# Patient Record
Sex: FEMALE | Race: WHITE | NOT HISPANIC OR LATINO | Employment: FULL TIME | URBAN - METROPOLITAN AREA
[De-identification: names, ages, dates, MRNs, and addresses within clinical notes are randomized per-mention and may not be internally consistent; named-entity substitution may affect disease eponyms.]

---

## 2017-12-08 ENCOUNTER — COMMUNICATION - HEALTHEAST (OUTPATIENT)
Dept: FAMILY MEDICINE | Facility: CLINIC | Age: 62
End: 2017-12-08

## 2018-01-09 ENCOUNTER — HOSPITAL ENCOUNTER (OUTPATIENT)
Dept: MAMMOGRAPHY | Facility: HOSPITAL | Age: 63
Discharge: HOME | End: 2018-01-09
Attending: FAMILY MEDICINE

## 2018-01-09 DIAGNOSIS — Z12.31 VISIT FOR SCREENING MAMMOGRAM: ICD-10-CM

## 2018-01-10 ENCOUNTER — RECORDS - HEALTHEAST (OUTPATIENT)
Dept: LAB | Facility: CLINIC | Age: 63
End: 2018-01-10

## 2018-01-10 LAB
CHOLEST SERPL-MCNC: 196 MG/DL
FASTING STATUS PATIENT QL REPORTED: NORMAL
HDLC SERPL-MCNC: 67 MG/DL
LDLC SERPL CALC-MCNC: 114 MG/DL
TRIGL SERPL-MCNC: 75 MG/DL

## 2018-01-11 LAB
HPV SOURCE: NORMAL
HUMAN PAPILLOMA VIRUS 16 DNA: NEGATIVE
HUMAN PAPILLOMA VIRUS 18 DNA: NEGATIVE
HUMAN PAPILLOMA VIRUS FINAL DIAGNOSIS: NORMAL
HUMAN PAPILLOMA VIRUS OTHER HR: NEGATIVE
SPECIMEN DESCRIPTION: NORMAL

## 2018-01-17 LAB
BKR LAB AP ABNORMAL BLEEDING: NO
BKR LAB AP BIRTH CONTROL/HORMONES: NORMAL
BKR LAB AP CERVICAL APPEARANCE: NORMAL
BKR LAB AP GYN ADEQUACY: NORMAL
BKR LAB AP GYN INTERPRETATION: NORMAL
BKR LAB AP HPV REFLEX: NORMAL
BKR LAB AP LMP: NORMAL
BKR LAB AP PATIENT STATUS: NORMAL
BKR LAB AP PREVIOUS ABNORMAL: NORMAL
BKR LAB AP PREVIOUS NORMAL: 2014
HIGH RISK?: NO
PATH REPORT.COMMENTS IMP SPEC: NORMAL
RESULT FLAG (HE HISTORICAL CONVERSION): NORMAL

## 2018-07-11 ENCOUNTER — COMMUNICATION - HEALTHEAST (OUTPATIENT)
Dept: FAMILY MEDICINE | Facility: CLINIC | Age: 63
End: 2018-07-11

## 2018-07-19 ENCOUNTER — OFFICE VISIT - HEALTHEAST (OUTPATIENT)
Dept: FAMILY MEDICINE | Facility: CLINIC | Age: 63
End: 2018-07-19

## 2018-07-19 ENCOUNTER — RECORDS - HEALTHEAST (OUTPATIENT)
Dept: ADMINISTRATIVE | Facility: OTHER | Age: 63
End: 2018-07-19

## 2018-07-19 ENCOUNTER — COMMUNICATION - HEALTHEAST (OUTPATIENT)
Dept: TELEHEALTH | Facility: CLINIC | Age: 63
End: 2018-07-19

## 2018-07-19 ENCOUNTER — AMBULATORY - HEALTHEAST (OUTPATIENT)
Dept: LAB | Facility: CLINIC | Age: 63
End: 2018-07-19

## 2018-07-19 DIAGNOSIS — Z13.1 SCREENING FOR DIABETES MELLITUS: ICD-10-CM

## 2018-07-19 DIAGNOSIS — R06.83 SNORING: ICD-10-CM

## 2018-07-19 DIAGNOSIS — Z13.29 SCREENING FOR THYROID DISORDER: ICD-10-CM

## 2018-07-19 DIAGNOSIS — Z13.228 SCREENING FOR ENDOCRINE, NUTRITIONAL, METABOLIC AND IMMUNITY DISORDER: ICD-10-CM

## 2018-07-19 DIAGNOSIS — Z13.0 SCREENING FOR ENDOCRINE, NUTRITIONAL, METABOLIC AND IMMUNITY DISORDER: ICD-10-CM

## 2018-07-19 DIAGNOSIS — Z13.220 SCREENING, LIPID: ICD-10-CM

## 2018-07-19 DIAGNOSIS — E66.01 SEVERE OBESITY (BMI 35.0-39.9) WITH COMORBIDITY (H): ICD-10-CM

## 2018-07-19 DIAGNOSIS — Z78.0 POSTMENOPAUSAL STATUS: ICD-10-CM

## 2018-07-19 DIAGNOSIS — G89.29 BILATERAL CHRONIC KNEE PAIN: ICD-10-CM

## 2018-07-19 DIAGNOSIS — M54.42 CHRONIC LEFT-SIDED LOW BACK PAIN WITH LEFT-SIDED SCIATICA: ICD-10-CM

## 2018-07-19 DIAGNOSIS — G89.29 CHRONIC LEFT-SIDED LOW BACK PAIN WITH LEFT-SIDED SCIATICA: ICD-10-CM

## 2018-07-19 DIAGNOSIS — Z13.29 SCREENING FOR ENDOCRINE, NUTRITIONAL, METABOLIC AND IMMUNITY DISORDER: ICD-10-CM

## 2018-07-19 DIAGNOSIS — F41.8 DEPRESSION WITH ANXIETY: ICD-10-CM

## 2018-07-19 DIAGNOSIS — M25.561 BILATERAL CHRONIC KNEE PAIN: ICD-10-CM

## 2018-07-19 DIAGNOSIS — Z13.21 SCREENING FOR ENDOCRINE, NUTRITIONAL, METABOLIC AND IMMUNITY DISORDER: ICD-10-CM

## 2018-07-19 DIAGNOSIS — I10 ESSENTIAL HYPERTENSION: ICD-10-CM

## 2018-07-19 DIAGNOSIS — M25.562 BILATERAL CHRONIC KNEE PAIN: ICD-10-CM

## 2018-07-19 DIAGNOSIS — Z13.0 SCREENING, ANEMIA, DEFICIENCY, IRON: ICD-10-CM

## 2018-07-19 DIAGNOSIS — Z13.228 SCREENING FOR METABOLIC DISORDER: ICD-10-CM

## 2018-07-19 LAB
ALBUMIN SERPL-MCNC: 3.7 G/DL (ref 3.5–5)
ALP SERPL-CCNC: 61 U/L (ref 45–120)
ALT SERPL W P-5'-P-CCNC: 13 U/L (ref 0–45)
ANION GAP SERPL CALCULATED.3IONS-SCNC: 11 MMOL/L (ref 5–18)
AST SERPL W P-5'-P-CCNC: 19 U/L (ref 0–40)
BASOPHILS # BLD AUTO: 0 THOU/UL (ref 0–0.2)
BASOPHILS NFR BLD AUTO: 1 % (ref 0–2)
BILIRUB SERPL-MCNC: 0.9 MG/DL (ref 0–1)
BUN SERPL-MCNC: 17 MG/DL (ref 8–22)
CALCIUM SERPL-MCNC: 9.5 MG/DL (ref 8.5–10.5)
CHLORIDE BLD-SCNC: 109 MMOL/L (ref 98–107)
CHOLEST SERPL-MCNC: 200 MG/DL
CO2 SERPL-SCNC: 24 MMOL/L (ref 22–31)
CREAT SERPL-MCNC: 0.77 MG/DL (ref 0.6–1.1)
EOSINOPHIL # BLD AUTO: 0.2 THOU/UL (ref 0–0.4)
EOSINOPHIL NFR BLD AUTO: 4 % (ref 0–6)
ERYTHROCYTE [DISTWIDTH] IN BLOOD BY AUTOMATED COUNT: 11.6 % (ref 11–14.5)
FASTING STATUS PATIENT QL REPORTED: YES
GFR SERPL CREATININE-BSD FRML MDRD: >60 ML/MIN/1.73M2
GLUCOSE BLD-MCNC: 95 MG/DL (ref 70–125)
HBA1C MFR BLD: 5.4 % (ref 3.5–6)
HCT VFR BLD AUTO: 42.4 % (ref 35–47)
HDLC SERPL-MCNC: 64 MG/DL
HGB BLD-MCNC: 14.6 G/DL (ref 12–16)
LDLC SERPL CALC-MCNC: 125 MG/DL
LYMPHOCYTES # BLD AUTO: 1.7 THOU/UL (ref 0.8–4.4)
LYMPHOCYTES NFR BLD AUTO: 33 % (ref 20–40)
MCH RBC QN AUTO: 30.5 PG (ref 27–34)
MCHC RBC AUTO-ENTMCNC: 34.4 G/DL (ref 32–36)
MCV RBC AUTO: 89 FL (ref 80–100)
MONOCYTES # BLD AUTO: 0.4 THOU/UL (ref 0–0.9)
MONOCYTES NFR BLD AUTO: 7 % (ref 2–10)
NEUTROPHILS # BLD AUTO: 3 THOU/UL (ref 2–7.7)
NEUTROPHILS NFR BLD AUTO: 55 % (ref 50–70)
PLATELET # BLD AUTO: 212 THOU/UL (ref 140–440)
PMV BLD AUTO: 7.8 FL (ref 7–10)
POTASSIUM BLD-SCNC: 4.3 MMOL/L (ref 3.5–5)
PROT SERPL-MCNC: 6.8 G/DL (ref 6–8)
RBC # BLD AUTO: 4.77 MILL/UL (ref 3.8–5.4)
SODIUM SERPL-SCNC: 144 MMOL/L (ref 136–145)
TRIGL SERPL-MCNC: 57 MG/DL
TSH SERPL DL<=0.005 MIU/L-ACNC: 1.51 UIU/ML (ref 0.3–5)
WBC: 5.3 THOU/UL (ref 4–11)

## 2018-07-19 RX ORDER — CITALOPRAM HYDROBROMIDE 20 MG/1
TABLET ORAL
Status: SHIPPED | COMMUNITY
Start: 2018-07-18

## 2018-07-19 ASSESSMENT — MIFFLIN-ST. JEOR: SCORE: 1887.18

## 2018-07-19 NOTE — ASSESSMENT & PLAN NOTE
Recommend follow up with primary MD to address this. Weight loss may also help.   HTN  Patient is asked to monitor BP at home or work, several times per month and return with written values at next office visit. Goal bp is 120/80. If staying higher than 130/85 on three occasions you should bring the values into clinic so that we can evaluate and treat if needed.    Recommend stop etoh, caffiene, ibuprofen, carbonated drinks, sudafed & decrease salt intake.

## 2018-07-19 NOTE — ASSESSMENT & PLAN NOTE
Pt reported at weight loss intake.   STOPBANG (SLEEP APNEA RISK ) SCORE:   Do you SNORE loudly (louder than talking or loud enough to be heard through closed door?  UNKNOWN - LIVES ALONE.  She does wake herself up with snoring sometimes    Do you often feel TIRED fatigued, or sleepy during daytime?  no    Has anyone OBSERVED you stop breathing during sleep?  unknown    Do you have or are you being treated for high blood PRESSURE? She has bp 140/84    BMI over 35? Yes   AGE over 50? yes  NECK circumfrence over 16?  15.5  GENDER male? no      High risk of WILLAM: Yes 5 - 8   Intermediate risk of WILLAM: Yes 3 - 4   Low risk of WILLAM: Yes 0 - 2      Suspect intermediate to high risk of sleep apnea. Recommend sleep study. She declined this.

## 2018-07-20 LAB — 25(OH)D3 SERPL-MCNC: 23.7 NG/ML (ref 30–80)

## 2018-07-30 ENCOUNTER — COMMUNICATION - HEALTHEAST (OUTPATIENT)
Dept: FAMILY MEDICINE | Facility: CLINIC | Age: 63
End: 2018-07-30

## 2019-02-14 ENCOUNTER — HOSPITAL ENCOUNTER (OUTPATIENT)
Dept: PHYSICAL MEDICINE AND REHAB | Facility: CLINIC | Age: 64
Discharge: HOME | End: 2019-02-14
Attending: PAIN MEDICINE

## 2019-02-14 DIAGNOSIS — M47.816 LUMBAR FACET ARTHROPATHY: ICD-10-CM

## 2019-02-14 DIAGNOSIS — M79.18 MYOFASCIAL PAIN: ICD-10-CM

## 2019-02-14 DIAGNOSIS — M54.16 LUMBAR RADICULOPATHY: ICD-10-CM

## 2019-02-14 DIAGNOSIS — M41.9 SCOLIOSIS OF THORACOLUMBAR SPINE, UNSPECIFIED SCOLIOSIS TYPE: ICD-10-CM

## 2019-02-14 ASSESSMENT — MIFFLIN-ST. JEOR: SCORE: 1887.06

## 2019-02-19 ENCOUNTER — OFFICE VISIT - HEALTHEAST (OUTPATIENT)
Dept: PHYSICAL THERAPY | Facility: REHABILITATION | Age: 64
End: 2019-02-19

## 2019-02-19 DIAGNOSIS — M54.16 RIGHT LUMBAR RADICULOPATHY: ICD-10-CM

## 2019-02-19 DIAGNOSIS — M62.81 GENERALIZED MUSCLE WEAKNESS: ICD-10-CM

## 2019-02-26 ENCOUNTER — RECORDS - HEALTHEAST (OUTPATIENT)
Dept: RADIOLOGY | Facility: CLINIC | Age: 64
End: 2019-02-26

## 2019-02-26 ENCOUNTER — OFFICE VISIT - HEALTHEAST (OUTPATIENT)
Dept: PHYSICAL THERAPY | Facility: REHABILITATION | Age: 64
End: 2019-02-26

## 2019-02-26 DIAGNOSIS — M62.81 GENERALIZED MUSCLE WEAKNESS: ICD-10-CM

## 2019-02-26 DIAGNOSIS — M54.16 RIGHT LUMBAR RADICULOPATHY: ICD-10-CM

## 2019-03-01 ENCOUNTER — OFFICE VISIT - HEALTHEAST (OUTPATIENT)
Dept: PHYSICAL THERAPY | Facility: REHABILITATION | Age: 64
End: 2019-03-01

## 2019-03-01 DIAGNOSIS — M54.16 RIGHT LUMBAR RADICULOPATHY: ICD-10-CM

## 2019-03-01 DIAGNOSIS — M62.81 GENERALIZED MUSCLE WEAKNESS: ICD-10-CM

## 2019-03-05 ENCOUNTER — OFFICE VISIT - HEALTHEAST (OUTPATIENT)
Dept: PHYSICAL THERAPY | Facility: REHABILITATION | Age: 64
End: 2019-03-05

## 2019-03-05 DIAGNOSIS — M54.16 RIGHT LUMBAR RADICULOPATHY: ICD-10-CM

## 2019-03-05 DIAGNOSIS — M62.81 GENERALIZED MUSCLE WEAKNESS: ICD-10-CM

## 2019-03-08 ENCOUNTER — OFFICE VISIT - HEALTHEAST (OUTPATIENT)
Dept: PHYSICAL THERAPY | Facility: REHABILITATION | Age: 64
End: 2019-03-08

## 2019-03-08 ENCOUNTER — COMMUNICATION - HEALTHEAST (OUTPATIENT)
Dept: PHYSICAL MEDICINE AND REHAB | Facility: CLINIC | Age: 64
End: 2019-03-08

## 2019-03-08 DIAGNOSIS — M54.16 RIGHT LUMBAR RADICULOPATHY: ICD-10-CM

## 2019-03-08 DIAGNOSIS — M62.81 GENERALIZED MUSCLE WEAKNESS: ICD-10-CM

## 2019-03-11 ENCOUNTER — OFFICE VISIT - HEALTHEAST (OUTPATIENT)
Dept: PHYSICAL THERAPY | Facility: REHABILITATION | Age: 64
End: 2019-03-11

## 2019-03-11 DIAGNOSIS — I10 ESSENTIAL HYPERTENSION: ICD-10-CM

## 2019-03-11 DIAGNOSIS — M54.16 RIGHT LUMBAR RADICULOPATHY: ICD-10-CM

## 2019-03-11 DIAGNOSIS — M62.81 GENERALIZED MUSCLE WEAKNESS: ICD-10-CM

## 2019-03-11 DIAGNOSIS — E66.01 SEVERE OBESITY (BMI 35.0-39.9) WITH COMORBIDITY (H): ICD-10-CM

## 2019-03-11 DIAGNOSIS — F41.8 DEPRESSION WITH ANXIETY: ICD-10-CM

## 2019-03-13 ENCOUNTER — OFFICE VISIT - HEALTHEAST (OUTPATIENT)
Dept: PHYSICAL THERAPY | Facility: REHABILITATION | Age: 64
End: 2019-03-13

## 2019-03-13 DIAGNOSIS — F41.8 DEPRESSION WITH ANXIETY: ICD-10-CM

## 2019-03-13 DIAGNOSIS — I10 ESSENTIAL HYPERTENSION: ICD-10-CM

## 2019-03-13 DIAGNOSIS — M54.16 RIGHT LUMBAR RADICULOPATHY: ICD-10-CM

## 2019-03-13 DIAGNOSIS — M62.81 GENERALIZED MUSCLE WEAKNESS: ICD-10-CM

## 2019-03-13 DIAGNOSIS — E66.01 SEVERE OBESITY (BMI 35.0-39.9) WITH COMORBIDITY (H): ICD-10-CM

## 2019-03-18 ENCOUNTER — HOSPITAL ENCOUNTER (OUTPATIENT)
Dept: PHYSICAL MEDICINE AND REHAB | Facility: CLINIC | Age: 64
Discharge: HOME | End: 2019-03-18
Attending: PAIN MEDICINE

## 2019-03-18 DIAGNOSIS — M17.0 PRIMARY OSTEOARTHRITIS OF BOTH KNEES: ICD-10-CM

## 2019-03-18 DIAGNOSIS — M41.9 SCOLIOSIS OF THORACOLUMBAR SPINE, UNSPECIFIED SCOLIOSIS TYPE: ICD-10-CM

## 2019-03-18 DIAGNOSIS — M47.816 LUMBAR FACET ARTHROPATHY: ICD-10-CM

## 2019-03-18 DIAGNOSIS — M79.18 MYOFASCIAL PAIN: ICD-10-CM

## 2019-03-18 DIAGNOSIS — M54.16 LUMBAR RADICULOPATHY: ICD-10-CM

## 2019-03-18 RX ORDER — IBUPROFEN 200 MG
600 TABLET ORAL DAILY
Status: SHIPPED | COMMUNITY
Start: 2019-03-18

## 2019-03-21 ENCOUNTER — HOSPITAL ENCOUNTER (OUTPATIENT)
Dept: PHYSICAL MEDICINE AND REHAB | Facility: CLINIC | Age: 64
Discharge: HOME | End: 2019-03-21
Attending: PAIN MEDICINE

## 2019-03-21 DIAGNOSIS — M17.0 PRIMARY OSTEOARTHRITIS OF BOTH KNEES: ICD-10-CM

## 2019-03-27 ENCOUNTER — HOSPITAL ENCOUNTER (OUTPATIENT)
Dept: MAMMOGRAPHY | Facility: CLINIC | Age: 64
Discharge: HOME | End: 2019-03-27
Attending: FAMILY MEDICINE

## 2019-03-27 DIAGNOSIS — Z12.31 ENCOUNTER FOR SCREENING MAMMOGRAM FOR BREAST CANCER: ICD-10-CM

## 2019-04-22 ENCOUNTER — HOSPITAL ENCOUNTER (OUTPATIENT)
Dept: PHYSICAL MEDICINE AND REHAB | Facility: CLINIC | Age: 64
Discharge: HOME | End: 2019-04-22
Attending: PAIN MEDICINE

## 2019-04-22 DIAGNOSIS — M41.9 SCOLIOSIS OF THORACOLUMBAR SPINE, UNSPECIFIED SCOLIOSIS TYPE: ICD-10-CM

## 2019-04-22 DIAGNOSIS — M79.18 MYOFASCIAL PAIN: ICD-10-CM

## 2019-04-22 DIAGNOSIS — M54.16 LUMBAR RADICULOPATHY: ICD-10-CM

## 2019-04-22 DIAGNOSIS — M17.0 PRIMARY OSTEOARTHRITIS OF BOTH KNEES: ICD-10-CM

## 2019-04-22 ASSESSMENT — MIFFLIN-ST. JEOR: SCORE: 1907.02

## 2019-05-09 ENCOUNTER — OFFICE VISIT - HEALTHEAST (OUTPATIENT)
Dept: FAMILY MEDICINE | Facility: CLINIC | Age: 64
End: 2019-05-09

## 2019-05-09 DIAGNOSIS — H81.12 BENIGN PAROXYSMAL POSITIONAL VERTIGO OF LEFT EAR: ICD-10-CM

## 2019-05-09 DIAGNOSIS — M54.42 CHRONIC LEFT-SIDED LOW BACK PAIN WITH LEFT-SIDED SCIATICA: ICD-10-CM

## 2019-05-09 DIAGNOSIS — G89.29 CHRONIC LEFT-SIDED LOW BACK PAIN WITH LEFT-SIDED SCIATICA: ICD-10-CM

## 2019-05-09 DIAGNOSIS — E66.01 SEVERE OBESITY (BMI 35.0-39.9) WITH COMORBIDITY (H): ICD-10-CM

## 2019-05-09 RX ORDER — CLOBETASOL PROPIONATE 0.5 MG/ML
SOLUTION TOPICAL
Refills: 6 | Status: SHIPPED | COMMUNITY
Start: 2019-04-23

## 2019-05-09 RX ORDER — FLUOCINOLONE ACETONIDE 0.11 MG/ML
OIL TOPICAL
Status: SHIPPED | COMMUNITY
Start: 2019-04-23

## 2019-05-09 RX ORDER — KETOCONAZOLE 20 MG/ML
SHAMPOO TOPICAL
Refills: 6 | Status: SHIPPED | COMMUNITY
Start: 2019-04-23

## 2019-05-09 ASSESSMENT — MIFFLIN-ST. JEOR: SCORE: 1947.84

## 2019-05-09 NOTE — ASSESSMENT & PLAN NOTE
Newly diagnosed in clinic today after having patient lay down and turn her head to the left she had vertical nystagmus which lasted under 60 seconds.    She is referred to physical therapy and if that is not successful we could consider ENT consult.

## 2019-05-09 NOTE — ASSESSMENT & PLAN NOTE
She says that she is seeing Dr. Bland at the [facility] spine center and is doing physical therapy and taking gabapentin 3 timesdaily which helps her pain.  She is done some intervertebral spine injections through the orthopedic surgeon in the past but did not like the idea of doing that so frequently and so prefers the idea of physical therapy and gabapentin.

## 2019-05-15 ENCOUNTER — COMMUNICATION - HEALTHEAST (OUTPATIENT)
Dept: PHYSICAL MEDICINE AND REHAB | Facility: CLINIC | Age: 64
End: 2019-05-15

## 2019-05-15 DIAGNOSIS — M54.16 LUMBAR RADICULOPATHY: ICD-10-CM

## 2019-05-15 DIAGNOSIS — M79.18 MYOFASCIAL PAIN: ICD-10-CM

## 2019-05-15 DIAGNOSIS — M41.9 SCOLIOSIS OF THORACOLUMBAR SPINE, UNSPECIFIED SCOLIOSIS TYPE: ICD-10-CM

## 2019-05-20 ENCOUNTER — COMMUNICATION - HEALTHEAST (OUTPATIENT)
Dept: PHYSICAL MEDICINE AND REHAB | Facility: CLINIC | Age: 64
End: 2019-05-20

## 2019-05-29 ENCOUNTER — COMMUNICATION - HEALTHEAST (OUTPATIENT)
Dept: SURGERY | Facility: CLINIC | Age: 64
End: 2019-05-29

## 2019-06-07 ENCOUNTER — COMMUNICATION - HEALTHEAST (OUTPATIENT)
Dept: FAMILY MEDICINE | Facility: CLINIC | Age: 64
End: 2019-06-07

## 2019-09-05 ENCOUNTER — COMMUNICATION - HEALTHEAST (OUTPATIENT)
Dept: PHYSICAL MEDICINE AND REHAB | Facility: CLINIC | Age: 64
End: 2019-09-05

## 2019-09-05 DIAGNOSIS — M79.18 MYOFASCIAL PAIN: ICD-10-CM

## 2019-09-05 DIAGNOSIS — M54.16 LUMBAR RADICULOPATHY: ICD-10-CM

## 2019-09-05 DIAGNOSIS — M41.9 SCOLIOSIS OF THORACOLUMBAR SPINE, UNSPECIFIED SCOLIOSIS TYPE: ICD-10-CM

## 2019-09-06 RX ORDER — GABAPENTIN 300 MG/1
CAPSULE ORAL
Qty: 270 CAPSULE | Refills: 1 | Status: SHIPPED | OUTPATIENT
Start: 2019-09-06 | End: 2021-10-01

## 2020-03-02 ENCOUNTER — RECORDS - HEALTHEAST (OUTPATIENT)
Dept: LAB | Facility: CLINIC | Age: 65
End: 2020-03-02

## 2020-03-03 LAB — 25(OH)D3 SERPL-MCNC: 24.1 NG/ML (ref 30–80)

## 2020-05-20 ENCOUNTER — HOSPITAL ENCOUNTER (OUTPATIENT)
Dept: MAMMOGRAPHY | Facility: CLINIC | Age: 65
Discharge: HOME | End: 2020-05-20
Attending: FAMILY MEDICINE

## 2020-05-20 DIAGNOSIS — Z12.31 VISIT FOR SCREENING MAMMOGRAM: ICD-10-CM

## 2020-09-09 ENCOUNTER — RECORDS - HEALTHEAST (OUTPATIENT)
Dept: LAB | Facility: CLINIC | Age: 65
End: 2020-09-09

## 2020-09-09 LAB
ANION GAP SERPL CALCULATED.3IONS-SCNC: 9 MMOL/L (ref 5–18)
BUN SERPL-MCNC: 18 MG/DL (ref 8–22)
CALCIUM SERPL-MCNC: 9.3 MG/DL (ref 8.5–10.5)
CHLORIDE BLD-SCNC: 107 MMOL/L (ref 98–107)
CO2 SERPL-SCNC: 26 MMOL/L (ref 22–31)
CREAT SERPL-MCNC: 0.86 MG/DL (ref 0.6–1.1)
GFR SERPL CREATININE-BSD FRML MDRD: >60 ML/MIN/1.73M2
GLUCOSE BLD-MCNC: 89 MG/DL (ref 70–125)
POTASSIUM BLD-SCNC: 4.4 MMOL/L (ref 3.5–5)
SODIUM SERPL-SCNC: 142 MMOL/L (ref 136–145)
TSH SERPL DL<=0.005 MIU/L-ACNC: 0.97 UIU/ML (ref 0.3–5)

## 2020-10-01 ENCOUNTER — RECORDS - HEALTHEAST (OUTPATIENT)
Dept: LAB | Facility: CLINIC | Age: 65
End: 2020-10-01

## 2020-10-01 LAB
ANION GAP SERPL CALCULATED.3IONS-SCNC: 9 MMOL/L (ref 5–18)
BUN SERPL-MCNC: 12 MG/DL (ref 8–22)
CALCIUM SERPL-MCNC: 9.1 MG/DL (ref 8.5–10.5)
CHLORIDE BLD-SCNC: 105 MMOL/L (ref 98–107)
CO2 SERPL-SCNC: 26 MMOL/L (ref 22–31)
CREAT SERPL-MCNC: 0.79 MG/DL (ref 0.6–1.1)
GFR SERPL CREATININE-BSD FRML MDRD: >60 ML/MIN/1.73M2
GLUCOSE BLD-MCNC: 80 MG/DL (ref 70–125)
POTASSIUM BLD-SCNC: 4.3 MMOL/L (ref 3.5–5)
SODIUM SERPL-SCNC: 140 MMOL/L (ref 136–145)

## 2020-12-03 ENCOUNTER — RECORDS - HEALTHEAST (OUTPATIENT)
Dept: LAB | Facility: HOSPITAL | Age: 65
End: 2020-12-03

## 2020-12-03 LAB
ERYTHROCYTE [DISTWIDTH] IN BLOOD BY AUTOMATED COUNT: 13.5 % (ref 11–14.5)
HCT VFR BLD AUTO: 44.9 % (ref 35–47)
HGB BLD-MCNC: 14.3 G/DL (ref 12–16)
MCH RBC QN AUTO: 28.8 PG (ref 27–34)
MCHC RBC AUTO-ENTMCNC: 31.8 G/DL (ref 32–36)
MCV RBC AUTO: 90 FL (ref 80–100)
PLATELET # BLD AUTO: 226 THOU/UL (ref 140–440)
PMV BLD AUTO: 10 FL (ref 8.5–12.5)
RBC # BLD AUTO: 4.97 MILL/UL (ref 3.8–5.4)
WBC: 4.8 THOU/UL (ref 4–11)

## 2021-05-24 ENCOUNTER — RECORDS - HEALTHEAST (OUTPATIENT)
Dept: ADMINISTRATIVE | Facility: CLINIC | Age: 66
End: 2021-05-24

## 2021-05-25 ENCOUNTER — RECORDS - HEALTHEAST (OUTPATIENT)
Dept: ADMINISTRATIVE | Facility: CLINIC | Age: 66
End: 2021-05-25

## 2021-05-25 ENCOUNTER — RECORDS - HEALTHEAST (OUTPATIENT)
Dept: SCHEDULING | Facility: CLINIC | Age: 66
End: 2021-05-25

## 2021-05-25 DIAGNOSIS — Z12.31 SCREENING MAMMOGRAM, ENCOUNTER FOR: ICD-10-CM

## 2021-05-26 ENCOUNTER — RECORDS - HEALTHEAST (OUTPATIENT)
Dept: ADMINISTRATIVE | Facility: CLINIC | Age: 66
End: 2021-05-26

## 2021-05-27 ENCOUNTER — RECORDS - HEALTHEAST (OUTPATIENT)
Dept: ADMINISTRATIVE | Facility: CLINIC | Age: 66
End: 2021-05-27

## 2021-05-28 ENCOUNTER — RECORDS - HEALTHEAST (OUTPATIENT)
Dept: ADMINISTRATIVE | Facility: CLINIC | Age: 66
End: 2021-05-28

## 2021-05-28 NOTE — PATIENT INSTRUCTIONS - HE
Please slowly increase her gabapentin to 900 mg 3 times a day.    Please continue doing her physical therapy exercises on a daily basis.    ~Please call Nurse Navigation line [phone] with any questions or concerns about your treatment plan, if symptoms worsen and you would like to be seen urgently, or if you have problems controlling bladder and bowel function.

## 2021-05-28 NOTE — PROGRESS NOTES
Assessment:     Diagnoses and all orders for this visit:    Lumbar radiculopathy    Primary osteoarthritis of both knees    Myofascial pain    Scoliosis of thoracolumbar spine, unspecified scoliosis type       Miryam Hoang is a 63 y.o. y.o. female with past medical history significant for obesity, depression, anxiety, snoring, postmenopausal status, bilateral chronic knee pain, essential hypertension, chronic left-sided low back with left-sided sciatica who presents today for follow-up regarding right knee joint injection under ultrasound guidance on 3/21/2018:    -Patient reports she received 100% relief from her right knee injection.  She continues to have some radicular type pain in her right lower extremity, however is much improved from our first visit.  She continues on gabapentin.     Plan:     A shared decision making plan was used. The patient's values and choices were respected. Prior medical records from her last visit on 3/18/2018 were reviewed today. The following represents what was discussed and decided upon by the provider and the patient.        -DIAGNOSTIC TESTS:  --MRI of the lumbar spine dated 11/7/2018 report is reviewed.  It does show a disc protrusion at L3-4 with possible contact to the right L4 nerve root.  At L1-L2 there is a slight increase in right central disc protrusion.  L2-3 there is an unchanged disc protrusion that may compress the left L3 nerve root.  At L4-5 there is unchanged moderate disc bulge with central disc protrusion with contact of bilateral traversing L5 nerve roots.  There is mild to moderate facet arthropathy at L5-S1, L4-5.    -INTERVENTIONS: No interventions at this time.    -MEDICATIONS: Recommend that she increase her gabapentin to 900 mg 3 times a day.  She will do this in a stepwise fashion as before.  -  Discussed side effects of medications and proper use. Patient verbalized understanding.    -PHYSICAL THERAPY: I recommend that she continue doing her  physical therapy exercises on a daily basis.  She has had 7 sessions of physical therapy.  Discussed the importance of core strengthening, ROM, stretching exercises with the patient and how each of these entities is important in decreasing pain.  Explained to the patient that the purpose of physical therapy is to teach the patient a home exercise program.  These exercises need to be performed every day in order to decrease pain and prevent future occurrences of pain.        -PATIENT EDUCATION: We discussed management of pain in a multimodal fashion including physical therapy, medication management, injections.    -FOLLOW UP: Patient will follow-up as needed.  Advised to contact clinic if symptoms worsen or change.    Subjective:     Miryam Hoang is a 63 y.o. female who presents today for follow-up regarding right ultrasound-guided knee injection as well as low back pain and radicular symptoms.  Patient reports that while she received 100% relief from her ultrasound-guided right knee injection.  Continues to have some numbness in her anterior shin on the right side.  She has minimal pain in her low back area.  Occasionally she will have pain that is radicular nature and pins-and-needles that is uncomfortable.  She feels that the gabapentin is very helpful she is currently on 600 mg 3 times a day.  Which she is also doing her exercise on the daily basis and finds that they are helpful.  Her pain today is 1/10 at its worst is 10/10 at its best is 1/10.  Pain is worsened with prolonged sitting and improved with moving, gabapentin, physical therapy.  She takes Advil or Aleve for pain and finds it is very helpful.  She denies any bowel or bladder changes, fevers, chills, unintentional weight loss.  In her right low back and anterior thigh and knee is achy and sometimes shooting type pain.    Evaluation to Date:  MRI of the lumbar spine dated 5/19/2008.  MRI of the lumbar spine dated 11/7/2018.     Treatment to Date: 7  sessions of physical therapy.Epidural steroid injection on 8/8/2008.  Ultrasound-guided right knee joint injection dated 3/21/2019.        Patient Active Problem List   Diagnosis     Depression with anxiety     Severe obesity (BMI 35.0-39.9) with comorbidity (H)     Snoring     Postmenopausal status     Bilateral chronic knee pain     Essential hypertension     Chronic left-sided low back pain with left-sided sciatica       Current Outpatient Medications on File Prior to Encounter   Medication Sig Dispense Refill     aspirin 81 mg chewable tablet Chew 81 mg daily.       calcium-vitamin D (CALCIUM-VITAMIN D) 500 mg(1,250mg) -200 unit per tablet Take 1 tablet by mouth 2 (two) times a day with meals.       citalopram (CELEXA) 20 MG tablet        ergocalciferol (VITAMIN D2) 50,000 unit capsule Take 50,000 Units by mouth once a week.       gabapentin (NEURONTIN) 300 MG capsule Take 1 capsule (300 mg total) by mouth 3 (three) times a day. Increase to 2 tablets three times a day as instructed. (Patient taking differently: Take 300 mg by mouth 3 (three) times a day. Increase to 2 tablets three times a day as instructed.      ) 180 capsule 1     ibuprofen (ADVIL) 200 MG tablet Take 600 mg by mouth daily.       pediatric multivitamin-iron (POLY-VI-SOL WITH IRON) chewable tablet Chew 1 tablet daily.       diclofenac (VOLTAREN) 75 MG EC tablet Take 75 mg by mouth daily as needed.       No current facility-administered medications on file prior to encounter.        No Known Allergies    No past medical history on file.     Review of Systems  ROS:  Specifically negative for bowel/bladder dysfunction, balance changes, headache, dizziness, foot drop, fevers, chills, appetite changes, nausea/vomiting, unexplained weight loss. Otherwise 13 systems reviewed are negative. Please see the patient's intake questionnaire from today for details.    Reviewed Social, Family, Past Medical and Past Surgical history with patient, no significant  "changes noted since prior visit.     Objective:     /90 (Patient Site: Right Arm, Patient Position: Sitting, Cuff Size: Adult Large)   Pulse 60   Ht 5' 9.25\" (1.759 m)   Wt (!) 283 lb (128.4 kg)   BMI 41.49 kg/m      PHYSICAL EXAMINATION:    --CONSTITUTIONAL: Well developed, well nourished, healthy appearing individual.  --PSYCHIATRIC: Appropriate mood and affect. No difficulty interacting due to temper, social withdrawal, or memory issues.  --SKIN: Lumbar region is dry and intact.   --RESPIRATORY: Normal rhythm and effort. No abnormal accessory muscle breathing patterns noted.   --MUSCULOSKELETAL:  Normal lumbar lordosis noted, no lateral shift.  --GROSS MOTOR: Easily arises from a seated position. Gait is non-antalgic  --LUMBAR SPINE:  Inspection reveals no evidence of deformity. Range of motion is not limited in lumbar flexion, extension, lateral rotation.  She has tenderness palpation along the right low lumbar paraspinal muscles.  Straight leg raising in the seated position is negative to radicular pain. Sciatic notch non-tender.   Knee: Mild tenderness to palpation, however improved from before injection.  --LOWER EXTREMITY MOTOR TESTING:  Plantar flexion left 5/5, right 5/5   Dorsiflexion left 5/5, right 5/5   Great toe MTP extension left 5/5, right 5/5  Knee flexion left 5/5, right 5/5  Knee extension left 5/5, right 5/5   Hip flexion left 5/5, right 5/5  Hip abduction left 5/5, right 5/5  Hip adduction left 5/5, right 5/5   --NEUROLOGIC: Bilateral patellar and achilles reflexes are physiologic and symmetric. Sensation to light touch is intact in the bilateral L4, L5, and S1 dermatomes.    RESULTS:   Imaging: Lumbar spine imaging was reviewed today.                         "

## 2021-05-28 NOTE — PROGRESS NOTES
ASSESSMENT AND PLAN:    We spent 25 minutes today in direct patient contact, 100% of the time in consultation concerning medical problems as listed below.     Problem List Items Addressed This Visit        High    Severe obesity (BMI 35.0-39.9) with comorbidity (H) (Chronic)     She said she will make an appointment to discuss weight loss.         Chronic left-sided low back pain with left-sided sciatica     She says that she is seeing Dr. Bland at the [facility] spine center and is doing physical therapy and taking gabapentin 3 times daily which helps her pain.  She is done some intervertebral spine injections through the orthopedic surgeon in the past but did not like the idea of doing that so frequently and so prefers the idea of physical therapy and gabapentin.         Benign paroxysmal positional vertigo of left ear - Primary     Newly diagnosed in clinic today after having patient lay down and turn her head to the left she had vertical nystagmus which lasted under 60 seconds.    She is referred to physical therapy and if that is not successful we could consider ENT consult.         Relevant Orders    Ambulatory referral to PT/OT           No chief complaint on file.       NGHIA Hoang is a 63 y.o. female says that her primary physician, Dr. Miryam Guerra has moved to [place] and that is just too far for her to go so she is hoping that she can establish her care here in [place].    However, today she would like me to address a more pressing issue then establishing care and she plans to reschedule a day when we can go through her chart and update her problem list etc.    Today she tells me that for the last 1 week she is been getting really dizzy when she turns her head to the left.  This will last for 30 to 40 seconds and is described as a carousel-like sensation.  She said that she was getting a facial and when she sat up and turn her head to the left she started to get so dizzy that she  thought she might pass out or throw up but after 30 or 40 seconds it resolved on its own.  She also notes that this is happened on other occasions when she is turning her head to the left.    She otherwise feels pretty well.    Social History     Tobacco Use   Smoking Status Never Smoker   Smokeless Tobacco Never Used      Current Outpatient Medications on File Prior to Visit   Medication Sig Dispense Refill     calcium-vitamin D (CALCIUM-VITAMIN D) 500 mg(1,250mg) -200 unit per tablet Take 1 tablet by mouth 2 (two) times a day with meals.       citalopram (CELEXA) 20 MG tablet        clobetasol (TEMOVATE) 0.05 % external solution APPLY TOPICALLY TO SCALP DAILY  6     ergocalciferol (VITAMIN D2) 50,000 unit capsule Take 50,000 Units by mouth once a week.       fluocinolone (DERMA-SMOOTHE) 0.01 % external oil        gabapentin (NEURONTIN) 300 MG capsule Take 1 capsule (300 mg total) by mouth 3 (three) times a day. Increase to 2 tablets three times a day as instructed. (Patient taking differently: Take 300 mg by mouth 3 (three) times a day. Increase to 2 tablets three times a day as instructed.      ) 180 capsule 1     ibuprofen (ADVIL) 200 MG tablet Take 600 mg by mouth daily.       ketoconazole (NIZORAL) 2 % shampoo APPLY AS DIRECTED TO SCALP 2-3 TIMES A WEEK AS NEEDED.  6     multivit-minerals/ferrous fum (MULTI VITAMIN ORAL) Take by mouth.       pediatric multivitamin-iron (POLY-VI-SOL WITH IRON) chewable tablet Chew 1 tablet daily.       [DISCONTINUED] aspirin 81 mg chewable tablet Chew 81 mg daily.       [DISCONTINUED] diclofenac (VOLTAREN) 75 MG EC tablet Take 75 mg by mouth daily as needed.       No current facility-administered medications on file prior to visit.       No Known Allergies      Review of Systems   Constitutional: Negative.    HENT: Negative.    Eyes: Negative.    Respiratory: Negative.    Cardiovascular: Negative.    Gastrointestinal: Negative.    Endocrine: Negative.    Genitourinary:  "Negative.    Musculoskeletal: Positive for back pain (chronic, not new).   Skin: Negative.    Neurological: Positive for dizziness.   Hematological: Negative.    Psychiatric/Behavioral: The patient is nervous/anxious (9 months ago started citalopram and that helps.).         OBJECTIVE: /78 (Patient Site: Left Arm, Patient Position: Sitting, Cuff Size: Adult Large)   Pulse 72   Ht 5' 9.25\" (1.759 m)   Wt (!) 292 lb (132.5 kg)   BMI 42.81 kg/m     Physical Exam   Constitutional: She is oriented to person, place, and time. She appears well-developed and well-nourished. No distress.   HENT:   Head: Normocephalic and atraumatic.   Right Ear: External ear normal.   Left Ear: External ear normal.   Nose: Nose normal.   Mouth/Throat: Oropharynx is clear and moist.   Eyes: Pupils are equal, round, and reactive to light. Conjunctivae and EOM are normal.   Neck: Neck supple.   Cardiovascular: Normal rate, regular rhythm and normal heart sounds.   Pulmonary/Chest: Effort normal and breath sounds normal.   Musculoskeletal: Normal range of motion.   Neurological: She is alert and oriented to person, place, and time.   Neuro exam is normal including gait, rapidly alternating movements, extraocular movements are intact, pupils are equally round and reactive to light and accommodation, there is no arm drift, she is able to stand on 1 foot at a time without losing balance, finger to nose is normal.  Coordination is normal.  Sensation is symmetrical bilaterally. facial symmetry is normal.  Normal Romberg exam.  Normal heel-to-shin.     When I asked her to lay flat and then turn her head to the left she spontaneously became very dizzy.  I could appreciate vertical nystagmus which did resolve over the next 60 seconds.   Skin: Skin is warm and dry.   Psychiatric: She has a normal mood and affect.        This note was created using Dragon dictation.  Please excuse any grammatical errors.   "

## 2021-05-28 NOTE — TELEPHONE ENCOUNTER
Patient calling to ask for a new prescription for the Gabapentin. States she is now taking it 3 capsules three times a day and her current prescription says 2 three times a day. States she is tolerating the Gabapentin at the higher dose fine.Explained new one will be prepped for provider to authorize and send to her pharmacy. Stated understanding and appreciation. Pharmacy verified.

## 2021-05-29 NOTE — TELEPHONE ENCOUNTER
PT STARTED THE 24 WEEK PROGRAM ON 7/19/2018 AND WANTS TO GET BACK INTO THE PROGRAM. WOULD LIKE A CALL BACK TO DISCUSS FURTHER.    [phone]  ANYTIME OK TO LEAVE DETAILED MESSAGE

## 2021-05-29 NOTE — TELEPHONE ENCOUNTER
We have not had any response from Miryam, to get scheduled for PT. We have left voice mails and mailed a letter to her as well.

## 2021-05-30 ENCOUNTER — RECORDS - HEALTHEAST (OUTPATIENT)
Dept: ADMINISTRATIVE | Facility: CLINIC | Age: 66
End: 2021-05-30

## 2021-05-31 ENCOUNTER — RECORDS - HEALTHEAST (OUTPATIENT)
Dept: ADMINISTRATIVE | Facility: CLINIC | Age: 66
End: 2021-05-31

## 2021-06-01 ENCOUNTER — RECORDS - HEALTHEAST (OUTPATIENT)
Dept: ADMINISTRATIVE | Facility: CLINIC | Age: 66
End: 2021-06-01

## 2021-06-01 VITALS — WEIGHT: 276 LBS | HEIGHT: 70 IN | BODY MASS INDEX: 39.51 KG/M2

## 2021-06-01 NOTE — TELEPHONE ENCOUNTER
Call from pt requesting refill of gabapentin. Reports she utilizes 3-3-3 without side effects. Pharmacy verified. Refill order prepped for provider review.

## 2021-06-02 ENCOUNTER — RECORDS - HEALTHEAST (OUTPATIENT)
Dept: ADMINISTRATIVE | Facility: CLINIC | Age: 66
End: 2021-06-02

## 2021-06-02 VITALS — BODY MASS INDEX: 41.26 KG/M2 | HEIGHT: 69 IN | WEIGHT: 278.6 LBS

## 2021-06-02 VITALS — WEIGHT: 283.2 LBS | BODY MASS INDEX: 41.52 KG/M2

## 2021-06-02 VITALS — WEIGHT: 283.5 LBS | BODY MASS INDEX: 41.56 KG/M2

## 2021-06-03 VITALS — BODY MASS INDEX: 41.92 KG/M2 | WEIGHT: 283 LBS | HEIGHT: 69 IN

## 2021-06-03 VITALS — WEIGHT: 292 LBS | BODY MASS INDEX: 43.25 KG/M2 | HEIGHT: 69 IN

## 2021-06-16 PROBLEM — Z78.0 POSTMENOPAUSAL STATUS: Status: ACTIVE | Noted: 2018-07-19

## 2021-06-16 PROBLEM — R06.83 SNORING: Status: ACTIVE | Noted: 2018-07-19

## 2021-06-16 PROBLEM — M54.42 CHRONIC LEFT-SIDED LOW BACK PAIN WITH LEFT-SIDED SCIATICA: Status: ACTIVE | Noted: 2018-07-19

## 2021-06-16 PROBLEM — I10 ESSENTIAL HYPERTENSION: Status: ACTIVE | Noted: 2018-07-19

## 2021-06-16 PROBLEM — M25.562 BILATERAL CHRONIC KNEE PAIN: Status: ACTIVE | Noted: 2018-07-19

## 2021-06-16 PROBLEM — E66.01 SEVERE OBESITY (BMI 35.0-39.9) WITH COMORBIDITY (H): Chronic | Status: ACTIVE | Noted: 2018-07-19

## 2021-06-16 PROBLEM — M25.561 BILATERAL CHRONIC KNEE PAIN: Status: ACTIVE | Noted: 2018-07-19

## 2021-06-16 PROBLEM — G89.29 CHRONIC LEFT-SIDED LOW BACK PAIN WITH LEFT-SIDED SCIATICA: Status: ACTIVE | Noted: 2018-07-19

## 2021-06-16 PROBLEM — H81.12 BENIGN PAROXYSMAL POSITIONAL VERTIGO OF LEFT EAR: Status: ACTIVE | Noted: 2019-05-09

## 2021-06-16 PROBLEM — G89.29 BILATERAL CHRONIC KNEE PAIN: Status: ACTIVE | Noted: 2018-07-19

## 2021-06-16 PROBLEM — F41.8 DEPRESSION WITH ANXIETY: Status: ACTIVE | Noted: 2018-07-19

## 2021-06-17 NOTE — PATIENT INSTRUCTIONS - HE
Patient Instructions by Saray Al PT at 3/1/2019  7:30 AM     Author: Saray Al PT Service: -- Author Type: Physical Therapist    Filed: 3/1/2019  7:47 AM Encounter Date: 3/1/2019 Status: Signed    : Saray Al PT (Physical Therapist)               HIP FLEXOR STAIR STRETCH    Stand with one foot on the stair and one foot on the ground below. Keep your feet pointing straight ahead and lean into the leg on the stair keeping your back upright. You want to feel a stretch in the front of the hip of the leg that's on the ground.    Hold 30 seconds. 2-3 times on each side          Prone Quad Stretch    Lie down flat on your stomach. Wrap a strap (belt, towel, dog leash) around the top of one of your feet and pull the strap across your opposite shoulder so that your knee starts to curl up to your body. Pull until a stretch is felt across the front of your thigh.  CAN leave other foot flat on ground, hold 30 sec, 2-3 times on each side

## 2021-06-17 NOTE — PATIENT INSTRUCTIONS - HE
Patient Instructions by Sera Smith PT at 3/13/2019  7:00 AM     Author: Sera Smith PT Service: -- Author Type: Physical Therapist    Filed: 3/13/2019  7:26 AM Encounter Date: 3/13/2019 Status: Signed    : Sera Smith PT (Physical Therapist)        [employer]    Lying face down, slowly raise up and arch your back using your arms.    Hold 2-3 seconds  x10-15 repetitions  1-2 sets

## 2021-06-17 NOTE — PATIENT INSTRUCTIONS - HE
Patient Instructions by Cyn Mathew MD at 5/9/2019  3:40 PM     Author: Cyn Mathew MD Service: -- Author Type: Physician    Filed: 5/9/2019  4:34 PM Encounter Date: 5/9/2019 Status: Signed    : Cyn Mathew MD (Physician)       Patient Education     Benign Paroxysmal Positional Vertigo     Your health care provider may move your head in certain ways to treat your BPPV.     Benign paroxysmal positional vertigo (BPPV) is a problem with the inner ear. The inner ear contains the vestibular system. This system is what helps you keep your balance. BPPV causes a feeling of spinning. It is a common problem of the vestibular system.  Understanding the vestibular system  The vestibular system of the ear is made up of very tiny parts. They include the utricle, saccule, and semicircular canals. The utricle is a tiny organ that contains calcium crystals. In some people, the crystals can move into the semicircular canals. When this happens, the system no longer works as it should. This causes BPPV. Benign means it is not life threatening. Paroxysmal means it happens suddenly. Positional means that it happens when you move your head. Vertigo is a feeling of spinning.  What causes BPPV?  Causes include injury to your head or neck. Other problems with the vestibular system may cause BPPV. In many people, the cause of BPPV is not known.  Symptoms of BPPV  You many have repeated feelings of spinning (vertigo). The vertigo usually lasts less than 1 minute. Some movements, such as rolling over in bed, can bring on vertigo.  Diagnosing BPPV  Your primary healthcare provider may diagnose and treat your BPPV. Or you may see an ear, nose, and throat doctor (otolaryngologist). In some cases, you may see a nervous system doctor (neurologist).  The healthcare provider will ask about your symptoms and your medical history. He or she will examine you. You may have hearing and balance tests. As part of the exam, your  healthcare provider may have you move your head and body in certain ways. If you have BPPV, the movements can bring on vertigo. Your provider will also look for abnormal movements of your eyes. You may have other tests to check your vestibular or nervous systems.  Treatment for BPPV  Your healthcare provider may try to move the calcium crystals. This is done by having you move your head and neck in certain ways. This treatment is safe and often works well. You may also be told to do these movements at home. You may still have vertigo for a few weeks. Your healthcare provider will recheck your symptoms, usually in about a month. Special physical therapy may also be part of treatment. In rare cases, surgery may be needed for BPPV that does not go away.     When to call the healthcare provider  Call your healthcare provider right away if you have any of these:    Symptoms that do not go away with treatment    Symptoms that get worse    New symptoms   Date Last Reviewed: 5/1/2017 2000-2017 [employer]. [address] [place] [address], [place] [id number]. All rights reserved. This information is not intended as a substitute for professional medical care. Always follow your healthcare professional's instructions.

## 2021-06-17 NOTE — PATIENT INSTRUCTIONS - HE
Patient Instructions by Saray Al PT at 2/26/2019  7:30 AM     Author: Saray Al PT Service: -- Author Type: Physical Therapist    Filed: 2/26/2019  7:47 AM Encounter Date: 2/26/2019 Status: Signed    : Saray Al PT (Physical Therapist)        QUADRUPED ARM/LEG LIFT    Start on hands and knees while keeping your spine flat and neutral.  Tighten abdominal muscles.  Raise leg back and opposite arm and hold 3-5 seconds.  Return to original position and alternate. Go until fatigue, repeat 1-2 times        PLANK    While lying face down, lift your body up on your elbows and toes. Try and maintain a straight spine. Do not allow your hips or pelvis on either side to drop.     You may modify by planking on your knees- hold until fatigue and repeat 3-5 times

## 2021-06-17 NOTE — PATIENT INSTRUCTIONS - HE
Patient Instructions by Saray Al PT at 2/19/2019  7:30 AM     Author: Saray Al PT Service: -- Author Type: Physical Therapist    Filed: 2/19/2019  8:24 AM Encounter Date: 2/19/2019 Status: Addendum    : aSray Al PT (Physical Therapist)    Related Notes: Original Note by Saray Al PT (Physical Therapist) filed at 2/19/2019  8:07 AM       Femoral Nerve Sliders      INSTRUCTIONS:  ? Lie on your stomach and prop up on your elbows  ? Flex one knee toward the ceiling and also tip your head slightly back  ? One you reach the furthest you can go, slowly bring the leg down and also flex your head down  ? Repeat this motion without holding, just gently move up and down  ? Repeat _10-15____ times  ? Repeat _1-2____ sets  Repeat __3-5___ times per day     QUADRUPED LEG LIFT    Start on hands and knees while keeping your spine flat and neutral.  Tighten abdominal muscles.  Raise leg back and hold 3-5 seconds.  Return to original position and alternate legs. 5-10 reps, repeat 3 times      STANDING EXTENSIONS    Start by standing and place your hands on your hips with your thumbs grasping your low back. Lean back to arch your back then return to starting position. Use your thumbs to help isolate where you want to bend. 10-15 reps, every 30 min as needed for pain

## 2021-06-17 NOTE — PATIENT INSTRUCTIONS - HE
Patient Instructions by Saray Al PT at 3/8/2019  7:00 AM     Author: Saray Al PT Service: -- Author Type: Physical Therapist    Filed: 3/8/2019  7:24 AM Encounter Date: 3/8/2019 Status: Addendum    : Saray Al PT (Physical Therapist)    Related Notes: Original Note by Saray Al PT (Physical Therapist) filed at 3/8/2019  7:12 AM        CLAM SHELLS    While lying on your side with your knees bent (make a straight line from shoulders to knees), draw up the top knee while keeping contact of your feet together.    Do not let your pelvis roll back during the lifting     Hold at a top for a few seconds 10-15 reps, 2-3 times on each side.       Femoral Nerve Tensioners  INSTRUCTIONS:      ? Lie on your stomach and prop up on your elbows  ? Straighten your legs out and at the same time tip your head back  ? Flex one knee up and also tip your head down as far as you can comfortably go  ? Slowly bring the leg back down and the head back up  ? No holding, just gently move up and down. You many feel a pull/ache and even a few pins and needles  or tingles which is fine and expected  ? Repeat __10___ times  ? Repeat __1-2___ sets  ? Repeat __3-5___ times per day; START AND END WITH 3-5 reps with your head looking up while leg is bent          - - -

## 2021-06-19 NOTE — PROGRESS NOTES
ASSESSMENT AND PLAN:   I spent 40 minutes with the patient. 100 % of that time was spent face to face.    Dx:   Initial bmi is Body mass index is 39.6 kg/(m^2).  With the following weight related comorbid medical conditions: suspected Sleep apnea, depression with anxiety, hypertension, and bilateral chronic knee pain     BECAUSE OF ABOVE PROBLEMS IT IS STRONGLY ADVISED THAT Miryam LOSE WEIGHT.  BELOW IS MY PLAN FOR her     Since Miryam has morbid obesity, if conservative management does not work, could consider surgical management in the form of bariatric surgery.     Miryam  patient attended group visit to learn about obesity as a disease, an approach to treatment and metabolic factors.  Nutrition counseling reviewed.    She will start food journaling and contemplating how to increase her activity level as well.     Miryam Guerra MD PCP  Start weight 276 lbs, Body mass index is 39.6 kg/(m^2).  7/23/2018   Prescribed meds:   Supplements:  Recommend a Multivitamin, vitamin D 2000 IU per day and fish oil.   Goals this month: Start weight loss program, start to pay more attention to protein/ carb ratio on nutrition lables and if at all possible track diet.  Barriers to weight loss identified thus far:   Snoring - ? Sleep apnea.  CELEXA (WEIGHT POSITIVE),   DOES NOT LIKE TO COOK FOR ONE PERSON - MIGHT NEED HELP WITH RECIPES FOR VEGGIES.   WANTS AN EXERCISE PARTNER (DOES NOT LIKE TO DO IT ALONE).  EATS LOT OF CARBS (PASTA, BAGELS, PBJ, CHIPS, ORANGE JUICE)  Follow up monthly.      Problem List Items Addressed This Visit        High    Severe obesity (BMI 35.0-39.9) with comorbidity (H) (Chronic)       Unprioritized    Depression with anxiety     Takes celexa 20 mg po q day.          Relevant Medications    citalopram (CELEXA) 20 MG tablet    Snoring     Pt reported at weight loss intake.   STOPBANG (SLEEP APNEA RISK ) SCORE:   Do you SNORE loudly (louder than talking or loud enough to be heard through closed  door?  UNKNOWN - LIVES ALONE.  She does wake herself up with snoring sometimes    Do you often feel TIRED fatigued, or sleepy during daytime?  no    Has anyone OBSERVED you stop breathing during sleep?  unknown    Do you have or are you being treated for high blood PRESSURE? She has bp 140/84    BMI over 35? Yes   AGE over 50? yes  NECK circumfrence over 16?  15.5  GENDER male? no      High risk of WILLAM: Yes 5 - 8   Intermediate risk of WILLAM: Yes 3 - 4   Low risk of WILLAM: Yes 0 - 2      Suspect intermediate to high risk of sleep apnea. Recommend sleep study. She declined this.          Postmenopausal status    Bilateral chronic knee pain     She mentions that this limits her activity level.         Essential hypertension     Recommend follow up with primary MD to address this. Weight loss may also help.   HTN  Patient is asked to monitor BP at home or work, several times per month and return with written values at next office visit. Goal bp is 120/80. If staying higher than 130/85 on three occasions you should bring the values into clinic so that we can evaluate and treat if needed.    Recommend stop etoh, caffiene, ibuprofen, carbonated drinks, sudafed & decrease salt intake.           Chronic left-sided low back pain with left-sided sciatica     Hx herniated disk and cortisone shot in past. Weight loss recommended.              Other Visit Diagnoses     Screening for metabolic disorder    -  Primary    Relevant Orders    Comprehensive Metabolic Panel (Completed)    Screening, anemia, deficiency, iron        Relevant Orders    HM1(CBC and Differential) (Completed)    HM1 (CBC with Diff) (Completed)    Screening, lipid        Relevant Orders    Lipid Cascade (Completed)    Screening for thyroid disorder        Relevant Orders    Thyroid Floyd (Completed)    Screening for endocrine, nutritional, metabolic and immunity disorder        Relevant Orders    Vitamin D, Total (25-Hydroxy) (Completed)    Screening for  diabetes mellitus        Relevant Orders    Glycosylated Hemoglobin A1c (Completed)         ______________________________________________________________________    INSULIN RESISTANCE/ METABOLIC SYNDROME WORK UP shreya  Lab Results   Component Value Date    HGBA1C 5.4 07/19/2018      Fasting blood sugar was 95.  (>100 indicative of metabolic syndrome)  Waist circumference was 45 INCHES - greater than 35 inches in women and 40 in men is indicative of metabolic syndrome. And central obesity was apparent.  Triglycerides were 57  (>150 is indicative of metabolic syndrome)  HDL 64 (<40 in females and <50 in males is indicative of metabolic syndrome)  bp was elevated to 140/84 (greater than 140/85 is indicative of metabolic syndrome)    _________________________________________________________________    SUBJECTIVE: Miryam Hoang is a 63 y.o. female comes in because she says she wants to concentrate on herself and caring for herself.  She wants to get motivated and would like to get her weight down to 200-2 25 pounds.  She does not remember when she was last at that weight.  She says she has been overweight her whole life.  In the past because of her weight she suffered from low self-esteem, body image dissatisfaction, and diminished sex drive.    In the past, related to her weight she says she is suffered from sleep apnea, low back pain, depression, and anxiety.    Family history is positive for obesity in her mother's, sister, uncle and cousins.  Positive for hypertension in mother and sister.  And positive for hyperlipidemia in her mother and her sister.    Social history: She is currently employed through the [employer] and her job title is  III.  She says she struggles financially and gets depressed and annoyed with herself.  She feels unhappy and scared that she will end up like her mother.  In her spare time she enjoys spending time with her sister, her nieces and nephews, reading, shopping  "and watching TV.  Life stress includes finances and always wanting to do everything right or make the right decisions.  She says she is  and lives alone.  Her cultural background is , Muslim.  [place]  She says she is never been involved in an abusive relationship.  She does not currently smoke but used to smoke in the past when she was in her early 20s so that she could \"look cool in the bars \".  She does drink alcohol and says it is 0-1 alcoholic beverages per week.  She says she is never abused illicit drugs or alcohol.    Nutrition history: She says she eats 3 times a day on average.  She does sometimes skip meals but she says it is pretty rare.  When asked to prepares her food she says she does it herself, her sister does it or she will eat fast food.  Barriers to choosing healthy food include the fact that she does not like to cook for one person, does not like a lot of fruits and likes vegetables but feels like they get old and boring and she does not take the time.  She tends to eat in front of the television, in restaurants or when she is with her sister she will eat at the table.  She eats meals away from home once or twice a week.  She does bring a bag lunch to work but sometimes eats convenience or store food.    Past attempts at weight loss: She says she lost a lot of weight on diet pills when she was about 20.  At age 42 she lost 40 pounds in weight watchers.  She says she is always belong to a gym or gone to classes.  She thinks the reason that she has been unsuccessful in the past is that she will get to a certain point, get stuck and then go back to old habits.  Triggers for her eating include boredom and habit.  She says she has never been diagnosed with an eating disorder, does not get up in the middle of the night to eat, never consumes large quantities of food at one time-binge eating, and does not describe herself as an emotional eater.  When asked how " her surroundings and fluids her eating she says she enjoys getting together to eat and drink with her family but they do not overindulge they usually eat good stuff like pasta, steak and dessert.    For physical activity she says she usually mows her lawn or gardens.  She is to be more physically active but stopped about 4 years ago and is not sure why exactly she stopped but she was trying to get in shape for her nephew's wedding and then for some reason she just did not continue doing that.  She thinks maybe she did not resume because she does not have time or maybe she got bored and she would like to do it with her sister but her sister is not interested.  She thinks her fitness level is fair but her endurance is not very good.  She says she does have access to places where she can be more active.  If there is any limitation it would be Monday.  She feels like she enjoys doing stand-alone exercises.  She does not like doing kickboxing or some other combination exercise she wants to focus on one body part at a time.  She says that she has arthritis in both her knees and that the pain has made it difficult for her to be as active as she wants to be.    WEIGHT LOSS INTAKE  Typical Daily Food:   Breakfast: Muffin or bagel, coffee, orange juice   Lunch: Bag salad, yogurt, veggies and DIP- PB J, chips, yogurt, fruit and dip   Dinner: Bagel with cream cheese, cereal or go to her sister's house for home-cooked meal   Snacks: Atrial mix with nuts,     Contraception: She is postmenopausal    See weight loss program intake form for more information/details.     Family History   Problem Relation Age of Onset     Breast cancer Mother 72     Breast cancer Sister 49    remember to refresh.    Review of Systems   Constitutional: Negative.    HENT: Negative.    Eyes: Negative.    Respiratory: Negative.    Cardiovascular: Negative.    Gastrointestinal: Negative.    Endocrine: Negative.    Genitourinary: Negative.   "  Musculoskeletal: Negative.    Skin: Negative.    Neurological: Negative.    Hematological: Negative.    Psychiatric/Behavioral: Negative.         EXAM  Initial Weight: 276 lbs  Weight: 276 lb (125.2 kg)  Weight loss from initial: 0  % Weight loss: 0 %  Waist Circumference: 45 inches  Neck Circumference: 15.5 inches     /84 (Patient Site: Left Arm, Patient Position: Sitting, Cuff Size: Adult Regular)  Pulse 65  Temp 98.3  F (36.8  C) (Oral)   Resp 12  Ht 5' 10\" (1.778 m)  Wt (!) 276 lb (125.2 kg)  SpO2 96%  Breastfeeding? No  BMI 39.6 kg/m2       Physical Exam   Constitutional: She is oriented to person, place, and time. She appears well-developed and well-nourished. No distress.   HENT:   Head: Normocephalic and atraumatic.   Eyes: Conjunctivae are normal.   Neck: Neck supple. No thyromegaly present.   Cardiovascular: Normal rate and regular rhythm.    Pulmonary/Chest: Effort normal.   Musculoskeletal: Normal range of motion.   Neurological: She is alert and oriented to person, place, and time.   Skin: Skin is warm and dry.   Psychiatric: She has a normal mood and affect.        Sit to stand test-13    Lab Results   Component Value Date    WBC 5.3 07/19/2018    HGB 14.6 07/19/2018    HCT 42.4 07/19/2018    MCV 89 07/19/2018     07/19/2018        Results for orders placed or performed in visit on 07/19/18   Comprehensive Metabolic Panel   Result Value Ref Range    Sodium 144 136 - 145 mmol/L    Potassium 4.3 3.5 - 5.0 mmol/L    Chloride 109 (H) 98 - 107 mmol/L    CO2 24 22 - 31 mmol/L    Anion Gap, Calculation 11 5 - 18 mmol/L    Glucose 95 70 - 125 mg/dL    BUN 17 8 - 22 mg/dL    Creatinine 0.77 0.60 - 1.10 mg/dL    GFR MDRD Af Amer >60 >60 mL/min/1.73m2    GFR MDRD Non Af Amer >60 >60 mL/min/1.73m2    Bilirubin, Total 0.9 0.0 - 1.0 mg/dL    Calcium 9.5 8.5 - 10.5 mg/dL    Protein, Total 6.8 6.0 - 8.0 g/dL    Albumin 3.7 3.5 - 5.0 g/dL    Alkaline Phosphatase 61 45 - 120 U/L    AST 19 0 - 40 " U/L    ALT 13 0 - 45 U/L        Lab Results   Component Value Date    HGBA1C 5.4 07/19/2018        Lab Results   Component Value Date    TSH 1.51 07/19/2018      Vit d pending.     Lab Results   Component Value Date    CHOL 200 (H) 07/19/2018    CHOL 196 01/10/2018    CHOL 187 12/16/2016     Lab Results   Component Value Date    HDL 64 07/19/2018    HDL 67 01/10/2018    HDL 67 12/16/2016     Lab Results   Component Value Date    LDLCALC 125 07/19/2018    LDLCALC 114 01/10/2018    LDLCALC 106 12/16/2016     Lab Results   Component Value Date    TRIG 57 07/19/2018    TRIG 75 01/10/2018    TRIG 68 12/16/2016     No components found for: CHOLHDL     Please excuse any grammatical errors as this note was dictated with Dragon Software

## 2021-06-24 NOTE — PROGRESS NOTES
[facility] Daily Progress     Patient Name: Miryam Henley  Date: 3/5/2019  Visit #:   PTA visit #:  na  Insurance:[employer]  Visit Max (if applicable): na  Referral Diagnosis: Lumbar radiculopathy  Referring provider: Talon Bland*  Visit Diagnosis:     ICD-10-CM    1. Right lumbar radiculopathy M54.16    2. Generalized muscle weakness M62.81      Patient Active Problem List   Diagnosis     Depression with anxiety     Severe obesity (BMI 35.0-39.9) with comorbidity (H)     Snoring     Postmenopausal status     Bilateral chronic knee pain     Essential hypertension     Chronic left-sided low back pain with left-sided sciatica       Assessment:     HEP/POC compliance is  fair .  Patient demonstrates understanding/independence with home program.  Response to Intervention Pt reports 0/10 pain after MT.   Patient is benefitting from skilled physical therapy and is making steady progress toward functional goals.  Patient is appropriate to continue with skilled physical therapy intervention, as indicated by initial plan of care.    Goal Status:  Pt. will be independent with home exercise program in : 4 weeks  Pt. will be able to walk : 30 minutes;for exercise/recreation;with less pain;in other weeks (<3 /10 pain)  Other Weeks:: 8 weeks    Pt will: be able to return to water aerobics with less than 3/10 pain to participate in her previous recreational activities in 8 weeks.  Pt will: be able to sit for >30 min with less than 3/10 pain for work in 8 weeks.      Plan / Patient Education:     Continue with initial plan of care.  Progress with home program as tolerated. progress with prone press ups and lifts    Subjective:     Pain Ratin R low back  Pt reports she was really busy over the weekend so did not get to as much of the exercises. It is more centered in her back even when it flares up. The numbness/tingling still comes and goes- more frequent tingling but it does not  last.    Objective:     Lumbar AROM:  Flexion: to toes   Ext: WNL  Rotation: R WNL, L WNL  Lateral flexion: R WNL, L WNL    Pt reports 0/10 pain at end of session    Exercises:  Exercise #1: standing ext 10x   Comment #1: femoral nerve sliders 15x on R  Exercise #2: quadriped leg extensions- progressed to bird dog 10 x 3-5 sec hold  Comment #2: NuStep 5 min, WL 6.0  Exercise #3: modified plank 4 x until fatigue  Comment #3: standing hip flexor stretch on stair 2 x 30 sec hold B  Exercise #4: prone quad stretch with strap 2 x 30 sec hold B        Treatment Today     TREATMENT MINUTES COMMENTS   Evaluation     Self-care/ Home management     Manual therapy 17 -L and R SL lumbar rotational mobilizations, grade III-IV   Neuromuscular Re-education     Therapeutic Activity     Therapeutic Exercises 8 -see exercise flow sheet for date completed  -educated on progress and POC   Gait training     Modality__________________                Total 25    Blank areas are intentional and mean the treatment did not include these items.       Saray lA, PT, DPT  3/5/2019

## 2021-06-24 NOTE — PROGRESS NOTES
[facility] Daily Progress     Patient Name: Miryam Henley  Date: 3/11/2019  Visit #:   PTA visit #:  na  Insurance:[employer]  Visit Max (if applicable): na  Referral Diagnosis: Lumbar radiculopathy  Referring provider: Talon Bland*  Visit Diagnosis:     ICD-10-CM    1. Right lumbar radiculopathy M54.16    2. Generalized muscle weakness M62.81      Patient Active Problem List   Diagnosis     Depression with anxiety     Severe obesity (BMI 35.0-39.9) with comorbidity (H)     Snoring     Postmenopausal status     Bilateral chronic knee pain     Essential hypertension     Chronic left-sided low back pain with left-sided sciatica       Assessment:     HEP/POC compliance is  good .  The patient demonstrates good lumbar ROM.  She is tolerating treatments well and responded well to nn tensioners since last session.  She is having improvements in her LE pain symptoms and is appropriate to continue with skilled PT services at this time.    Goal Status:  Pt. will be independent with home exercise program in : 4 weeks  Pt. will be able to walk : 30 minutes;for exercise/recreation;with less pain;in other weeks (<3 /10 pain)  Other Weeks:: 8 weeks    Pt will: be able to return to water aerobics with less than 3/10 pain to participate in her previous recreational activities in 8 weeks.  Pt will: be able to sit for >30 min with less than 3/10 pain for work in 8 weeks.    Plan / Patient Education:     Continue with initial plan of care.  Progress with home program as tolerated. progress with prone press ups and lifts, SL hip abduction, standing multifidus pulls with band, assess response to femoral nerve tensioners    Subjective:     Pain Ratin R low back  The patient reports that overall, everything has been going well.  She was in a lot of pain over the weekend.  Leg, thighs, and groin pain is a 2/10.  The numbness continues to be a constant.  She had tingling over the  weekend.    Objective:     Lumbar AROM:  Flexion: to toes   Ext: WNL  Rotation: R WNL, L WNL  Lateral flexion: R WNL, L WNL    Exercises:  Exercise #1: standing ext 10x   Comment #1: femoral nerve tensioners; discussed HEP  Exercise #2: BirdDog x 5 bilaterally; cues to move slowly through the motion to decrease tipping  Comment #2: NuStep 5 min, WL 6.0  Exercise #3: modified plank 4 x until fatigue  Comment #3: standing hip flexor stretch on stair 2 x 30 sec hold B  Exercise #4: prone quad stretch with strap 2 x 30 sec hold B  Comment #4: wallace discussed; HEP    Appt time: 7:01AM - 7:28AM    Treatment Today     TREATMENT MINUTES COMMENTS   Evaluation     Self-care/ Home management     Manual therapy 17 -Prone PA mobs to L1-L5 grade III x 30 x 3  -Prone on elbows PA mobs to L1-L5 grade III x 30 x 3   Neuromuscular Re-education     Therapeutic Activity     Therapeutic Exercises 10 -see exercise flow sheet for date completed   Gait training     Modality__________________                Total 27    Blank areas are intentional and mean the treatment did not include these items.       Sera Smith, PT, DPT  3/11/2019

## 2021-06-24 NOTE — PROGRESS NOTES
ASSESSMENT: Miryam Hoang is a 63 y.o. female who presents for consultation at the request of [facility] PCP Miryam Guerra MD, with a past medical history significant for obesity, depression and anxiety, snoring, postmenopausal status, bilateral chronic knee pain, essential hypertension, chronic left-sided low back with left-sided sciatica who presents today for new patient evaluation of low back pain and right lower extremity pain:    -Patient symptoms are consistent with right L3 or 4 lumbar radiculopathy.  MRI findings are consistent with this.  Overall the patient's physical exam is very reassuring that she has normal strength.  She does have reduced reflex at L4 on the right at 1/4 while all others are normal in the lower extremities.    Patient is neurologically intact on exam. No myelopathic or red flag symptoms.     SUHA Score: 40    WHO 5: 9     Diagnoses and all orders for this visit:    Lumbar radiculopathy  -     gabapentin (NEURONTIN) 300 MG capsule  Dispense: 180 capsule; Refill: 1  -     Ambulatory referral to Physical Therapy    Myofascial pain  -     gabapentin (NEURONTIN) 300 MG capsule  Dispense: 180 capsule; Refill: 1  -     Ambulatory referral to Physical Therapy    Scoliosis of thoracolumbar spine, unspecified scoliosis type  -     gabapentin (NEURONTIN) 300 MG capsule  Dispense: 180 capsule; Refill: 1  -     Ambulatory referral to Physical Therapy    Lumbar facet arthropathy  -     Ambulatory referral to Physical Therapy      PLAN:  Reviewed spine anatomy and disease process. Discussed diagnosis and treatment options with the patient today. A shared decision making model was used.  The patient's values and choices were respected. The following represents what was discussed and decided upon by the provider and the patient.      -DIAGNOSTIC TESTS:  Images were personally reviewed and interpreted and explained to patient today using spine model.   --MRI of the lumbar spine dated 11/7/2018 is  personally reviewed and images interpreted and shown to the patient.  It does show a disc protrusion at L3-4 with possible contact to the right L4 nerve root.  At L1-L2 there is a slight increase in right central disc protrusion.  L2-3 there is an unchanged disc protrusion that may compress the left L3 nerve root.  At L4-5 there is unchanged moderate disc bulge with central disc protrusion with contact of bilateral traversing L5 nerve roots.  There is mild to moderate facet arthropathy at L5-S1, L4-5.    -PHYSICAL THERAPY: Patient has had 2 sessions of physical therapy.  She is currently doing exercises on her own.  I have ordered physical therapy to work on core strengthening as well as nerve glides.  Like the patient to be shown home exercise program that she can do on a daily basis.  Discussed the importance of core strengthening, ROM, stretching exercises with the patient and how each of these entities is important in decreasing pain.  Explained to the patient that the purpose of physical therapy is to teach the patient a home exercise program.  These exercises need to be performed every day in order to decrease pain and prevent future occurrences of pain.        -MEDICATIONS: Patient can continue diclofenac once daily as needed.  I recommend that she take this with food.  I have written for gabapentin 300 mg that she will start at bedtime and increase in a stepwise fashion until she is at 600 mg 3 times a day.    -  Discussed multiple medication options today with patient. Discussed risks, side effects, and proper use of medications. Patient verbalized understanding.    -INTERVENTIONS: No interventions at this time.  Discussed risks and benefits of injections with patient today.    -PATIENT EDUCATION:  45 minutes of total visit time was spent face to face with the patient today, greater than 50% of total time spent with patient was spent on counseling, education, and coordinating care.   -10 minutes spent  outside of visit time, non-face-to-face time, reviewing chart.    -FOLLOW-UP:   Patient will follow up in 4 weeks.    Advised patient to call the [facility] if symptoms worsen or you have problems controlling bladder and bowel function.   ______________________________________________________________________    SUBJECTIVE:  HPI:  Miryam Hoang  Is a 63 y.o. female who presents today for new patient evaluation of low back pain and right lower limb pain that initially started in 2017.  This pain was more of a nuisance and numbness and tingling type pain in her right anterior shin.  She had a right L4-5 transforaminal epidural steroid injection for this with no improvement in of the numbness and tingling.  She continues to have this.  In October 2018 patient started having pain down the entire right leg.  In November 2018 patient had a right L3-4 transforaminal epidural steroid injection at [facility] which is very beneficial for the pain going down the entirety of her leg.  She now has continued right anterior thigh and groin pain.  In January 2019 she had a right L4-5 transforaminal epidural steroid injection with some improvement, however continues to have right anterior groin and thigh pain.    Patient reports that pain is worse with prolonged sitting and standing in one place.  Pain is improved with diclofenac as well as bending over and stretching.  Pain is improved with walking.  Patient has had 2 sessions of physical therapy through [facility].  She is doing exercises they showed her on a daily basis.  This has helped minimally.  Patient is not taking gabapentin.  She has not had any surgeries.  Patient denies any bowel or bladder changes, fevers, chills, unintentional weight loss.  -Treatment to Date: Epidural steroid injection on 8/8/2008.  MRI of the lumbar spine dated 5/19/2008.    -Medications:    Current Outpatient Medications on File Prior to Encounter   Medication Sig Dispense Refill  "    calcium-vitamin D (CALCIUM-VITAMIN D) 500 mg(1,250mg) -200 unit per tablet Take 1 tablet by mouth 2 (two) times a day with meals.       citalopram (CELEXA) 20 MG tablet        diclofenac (VOLTAREN) 75 MG EC tablet Take 75 mg by mouth daily as needed.       ergocalciferol (VITAMIN D2) 50,000 unit capsule Take 50,000 Units by mouth once a week.       pediatric multivitamin-iron (POLY-VI-SOL WITH IRON) chewable tablet Chew 1 tablet daily.       aspirin 81 mg chewable tablet Chew 81 mg daily.       No current facility-administered medications on file prior to encounter.        No Known Allergies    The past medical history: Anxiety    Patient Active Problem List   Diagnosis     Depression with anxiety     Severe obesity (BMI 35.0-39.9) with comorbidity (H)     Snoring     Postmenopausal status     Bilateral chronic knee pain     Essential hypertension     Chronic left-sided low back pain with left-sided sciatica       Past surgical history: Right knee arthroscopic surgery    Family History   Problem Relation Age of Onset     Breast cancer Mother 72     Breast cancer Sister 49       Reviewed past medical, surgical, and family history with patient found on new patient intake packet located in [employer] tab.     SOCIAL HX: The patient is .  She works as an .  She denies smoking cigarettes, she drinks alcohol 1-2 drinks of beer per month, and denies use of recreational drugs.    ROS: Specifically negative for bowel/bladder dysfunction, balance changes, headache, dizziness, foot drop, fevers, chills, appetite changes, nausea/vomiting, unexplained weight loss. Otherwise 13 systems reviewed are negative. Please see the patient's intake questionnaire from today for details.    OBJECTIVE:  /81 (Patient Site: Right Arm, Patient Position: Sitting, Cuff Size: Adult Large)   Pulse 73   Temp 98.1  F (36.7  C) (Oral)   Resp 19   Ht 5' 9.25\" (1.759 m)   Wt (!) 278 lb 9.6 oz (126.4 kg)   SpO2 96%   " BMI 40.85 kg/m      PHYSICAL EXAMINATION:    --CONSTITUTIONAL:  Vital signs as above.  No acute distress.  The patient is well nourished and well groomed.  --PSYCHIATRIC:  Appropriate mood and affect. The patient is awake, alert, oriented to person, place, time and answering questions appropriately with clear speech.    --SKIN:  Skin over the face, bilateral lower extremities, and posterior torso is clean, dry, intact without rashes.    --RESPIRATORY: Normal rhythm and effort. No abnormal accessory muscle breathing patterns noted.   --ABDOMINAL:  Soft, non-distended, non-tender throughout all quadrants.   --STANDING EXAMINATION:  Normal lumbar lordosis noted, no lateral shift.  --MUSCULOSKELETAL: Lumbar spine inspection reveals no evidence of deformity. Range of motion is not limited in lumbar flexion, extension, lateral rotation. No tenderness to palpation lumbar spine. Straight leg raising in the supine position is negative to radicular pain.  Reverse straight leg raise is also negative.  Sciatic notch non-tender.  --SACROILIAC JOINT: Negative distraction.  Negative Laya's with reproduction of pain to affected extremity. One Finger point test negative.  --GROSS MOTOR: Gait is antalgic at first, however after she walks for some time gait improves. Easily arises from a seated position. Toe walking and heel walking are normal without significant difficulty.    --LOWER EXTREMITY MOTOR TESTING:  Plantar flexion left 5/5, right 5/5   Dorsiflexion left 5/5, right 5/5   Great toe MTP extension left 5/5, right 5/5  Knee flexion left 5/5, right 5/5  Knee extension left 5/5, right 5/5   Hip flexion left 5/5, right 5/5  Hip abduction left 5/5, right 5/5  Hip adduction left 5/5, right 5/5   --HIPS: Full range of motion bilaterally.  Stinchfield test is negative bilaterally  --NEUROLOGICAL:  2/4 achilles reflexes bilaterally right patella 1/4 left patella 2/4.  Sensation to light touch is intact in the bilateral L4, L5, and S1  dermatomes. Babinski is negative. No clonus.  --VASCULAR:  2/4 dorsalis pedis and posterior tibialsi pulses bilaterally.  Bilateral lower extremities are warm.  There is mild edema of the bilateral lower extremities.    RESULTS: Prior medical records from [facility] were reviewed today.    Imaging: Lumbar spine Imaging was personally reviewed and interpreted today. The images were shown to the patient and the findings were explained using a spine model.      No results found.

## 2021-06-24 NOTE — PROGRESS NOTES
[facility] Daily Progress     Patient Name: Miryam Henley  Date: 3/8/2019  Visit #:   PTA visit #:  na  Insurance:[employer]  Visit Max (if applicable): na  Referral Diagnosis: Lumbar radiculopathy  Referring provider: Talon Bland*  Visit Diagnosis:     ICD-10-CM    1. Right lumbar radiculopathy M54.16    2. Generalized muscle weakness M62.81      Patient Active Problem List   Diagnosis     Depression with anxiety     Severe obesity (BMI 35.0-39.9) with comorbidity (H)     Snoring     Postmenopausal status     Bilateral chronic knee pain     Essential hypertension     Chronic left-sided low back pain with left-sided sciatica       Assessment:     HEP/POC compliance is  good .  Patient demonstrates understanding/independence with home program.  Response to Intervention Tolerated HEP progression with nerve tensioners. Overall, pain levels have remained low but pt continues to report numbness in her R anterior thigh.  Patient is benefitting from skilled physical therapy and is making steady progress toward functional goals.  Patient is appropriate to continue with skilled physical therapy intervention, as indicated by initial plan of care.    Goal Status:  Pt. will be independent with home exercise program in : 4 weeks  Pt. will be able to walk : 30 minutes;for exercise/recreation;with less pain;in other weeks (<3 /10 pain)  Other Weeks:: 8 weeks    Pt will: be able to return to water aerobics with less than 3/10 pain to participate in her previous recreational activities in 8 weeks.  Pt will: be able to sit for >30 min with less than 3/10 pain for work in 8 weeks.      Plan / Patient Education:     Continue with initial plan of care.  Progress with home program as tolerated. progress with prone press ups and lifts, SL hip abduction, standing multifidus pulls with band, assess response to femoral nerve tensioners    Subjective:     Pain Ratin R low back- when she walked  in  Pt was in more pain yesterday but was able to do exercises/stretches throughout the day. She did not have to take any pain medication. Has started doing aerobic exercise in the morning and that also has helped for 15 min on a bike. Her anterior thigh was slightly more painful this morning. Still has burning/numbness/tingling in her R anterior thigh to knee. The numbness is constant but burning and tingling is intermittent.    Objective:     Lumbar AROM:  Flexion: to toes   Ext: WNL  Rotation: R WNL, L WNL  Lateral flexion: R WNL, L WNL      Exercises:  Exercise #1: standing ext 10x   Comment #1: femoral nerve sliders 15x on R  Exercise #2: quadriped leg extensions- progressed to bird dog 10 x 3-5 sec hold  Comment #2: NuStep 5 min, WL 6.0  Exercise #3: modified plank 4 x until fatigue  Comment #3: standing hip flexor stretch on stair 2 x 30 sec hold B  Exercise #4: prone quad stretch with strap 2 x 30 sec hold B        Treatment Today     TREATMENT MINUTES COMMENTS   Evaluation     Self-care/ Home management     Manual therapy 10 -L SL R lumbar rotational mobilizations, grade III-IV   Neuromuscular Re-education     Therapeutic Activity     Therapeutic Exercises 14 -see exercise flow sheet for date completed  -educated on progress and POC   Gait training     Modality__________________                Total 24    Blank areas are intentional and mean the treatment did not include these items.       Saray Al, PT, DPT  3/8/2019

## 2021-06-24 NOTE — PROGRESS NOTES
"[facility] Daily Progress     Patient Name: Miryam Henley  Date: 2019  Visit #:   PTA visit #:  na  Insurance:[employer]  Visit Max (if applicable): na  Referral Diagnosis: Lumbar radiculopathy  Referring provider: Talon Bland*  Visit Diagnosis:     ICD-10-CM    1. Right lumbar radiculopathy M54.16    2. Generalized muscle weakness M62.81      Patient Active Problem List   Diagnosis     Depression with anxiety     Severe obesity (BMI 35.0-39.9) with comorbidity (H)     Snoring     Postmenopausal status     Bilateral chronic knee pain     Essential hypertension     Chronic left-sided low back pain with left-sided sciatica       Assessment:     HEP/POC compliance is  good .  Patient demonstrates understanding/independence with home program.  Response to Intervention Pt reports decreased pain after MT and tolerated HEP progression.  Patient is benefitting from skilled physical therapy and is making steady progress toward functional goals.  Patient is appropriate to continue with skilled physical therapy intervention, as indicated by initial plan of care.    Goal Status:  Pt. will be independent with home exercise program in : 4 weeks  Pt. will be able to walk : 30 minutes;for exercise/recreation;with less pain;in other weeks (<3 /10 pain)  Other Weeks:: 8 weeks    Pt will: be able to return to water aerobics with less than 3/10 pain to participate in her previous recreational activities in 8 weeks.  Pt will: be able to sit for >30 min with less than 3/10 pain for work in 8 weeks.      Plan / Patient Education:     Continue with initial plan of care.  Progress with home program as tolerated.    Subjective:     Pain Ratin anterolateral thigh and low back  The pain is worse in the back \"burn\" but less in her leg. She is trying to take less medication. Numbness in still present in her knee but the pain below her knee has improved.      Objective:     Lumbar AROM:  Flexion: " to toes with tightness in at  Ext: WNL  Rotation: R WNL, L WNL  Lateral flexion: R WNL, L WNL    Exercises:  Exercise #1: standing ext 10x   Comment #1: femoral nerve sliders 15x on R  Exercise #2: quadriped leg extensions- progressed to bird dog 10 x 3-5 sec hold  Comment #2: NuStep 5 min, WL 5.0  Exercise #3: modified plank 4 x until fatigue        Treatment Today     TREATMENT MINUTES COMMENTS   Evaluation     Self-care/ Home management     Manual therapy 10 -L SL R lumbar rotational mobilizations, grade II-III   Neuromuscular Re-education     Therapeutic Activity     Therapeutic Exercises 17 -see exercise flow sheet for date completed   Gait training     Modality__________________                Total 27    Blank areas are intentional and mean the treatment did not include these items.       Saray Al, PT, DPT  2/26/2019

## 2021-06-24 NOTE — PATIENT INSTRUCTIONS - HE
Discussed the importance of core strengthening, ROM, stretching exercises with the patient and how each of these entities is important in decreasing pain.  Explained to the patient that the purpose of physical therapy is to teach the patient a home exercise program.  These exercises need to be performed every day in order to decrease pain and prevent future occurrences of pain.        Prescribed Gabapentin today, 300 mg tablets, to be titrated up to 3 tablets 3 times a day as tolerated for your nerve pain. Please follow Gabapentin dosing chart below.    Gabapentin 300mg Dosing Chart    DATE  MORNING AFTERNOON BEDTIME    Day 1 0 0 1    Day 2 0 0 1    Day 3 0 0 1    Day 4 1 0 1    Day 5 1 0 1    Day 6 1 0 1    Day 7 1 1 1    Day 8 1 1 1    Day 9 1 1 1    Day 10 1 1 2    Day 11 1 1 2    Day 12 1 1 2    Day 13 2 1 2    Day 14 2 1 2    Day 15 2 1 2    Day 16 2 2 2    Day 17 2 2 2    Day 18 2 2 2                                                                    Continue medication, taking 2 capsules three times daily    Please call if you have any questions regarding how to take your medication  Clinic Phone # [phone]      ~Please call Nurse Navigation line [phone] with any questions or concerns about your treatment plan, if symptoms worsen and you would like to be seen urgently, or if you have problems controlling bladder and bowel function.

## 2021-06-24 NOTE — PROGRESS NOTES
[facility] Daily Progress     Patient Name: Miryam Henley  Date: 3/13/2019  Visit #:   PTA visit #:  na  Insurance:[employer]  Visit Max (if applicable): na  Referral Diagnosis: Lumbar radiculopathy  Referring provider: Talon Bland*  Visit Diagnosis:     ICD-10-CM    1. Right lumbar radiculopathy M54.16    2. Generalized muscle weakness M62.81      Patient Active Problem List   Diagnosis     Depression with anxiety     Severe obesity (BMI 35.0-39.9) with comorbidity (H)     Snoring     Postmenopausal status     Bilateral chronic knee pain     Essential hypertension     Chronic left-sided low back pain with left-sided sciatica       Assessment:     HEP/POC compliance is  good .  The patient demonstrates overall improvements in her pain level and LE symptoms.  She has started getting more tingling rather than numbness in her R LE, which is an improvement.  She is progressing well and is appropriate to continue with skilled PT services at this time.    Goal Status:  Pt. will be independent with home exercise program in : 4 weeks  Pt. will be able to walk : 30 minutes;for exercise/recreation;with less pain;in other weeks (<3 /10 pain)  Other Weeks:: 8 weeks    Pt will: be able to return to water aerobics with less than 3/10 pain to participate in her previous recreational activities in 8 weeks.  Pt will: be able to sit for >30 min with less than 3/10 pain for work in 8 weeks.    Plan / Patient Education:     Continue with initial plan of care.  Progress with home program as tolerated. progress with SL hip abduction, standing multifidus pulls with band, assess response to femoral nerve tensioners    Subjective:     Pain Ratin R low back into thigh  The patient reports she has been having more tingling in the front of her lower leg on the r rather than numbness.  She has some pain coming from her lower back and into her R hip area.  The lower back is good so far this morning.   She has been taking 3 Advil the last 3 days at 10am and then is good until 7pm.  Then she does her exercises and her pain is better.    Objective:     Lumbar AROM:  Flexion: to toes   Ext: WNL  Rotation: R WNL, L WNL  Lateral flexion: R WNL, L WNL    Exercises:  Exercise #1: standing ext 10x   Comment #1: femoral nerve tensioners; HEP  Exercise #2: BirdDog HEP  Comment #2: NuStep 5 min, WL 5.0  Exercise #3: modified plank 4 x until fatigue  Comment #3: standing hip flexor stretch on stair 2 x 30 sec hold B  Exercise #4: prone quad stretch with strap 2 x 30 sec hold B  Comment #4: clamshell; HEP  Exercise #5: Prone press ups  Comment #5: x 8    Appt time: 7:00AM - 7:27AM    Treatment Today     TREATMENT MINUTES COMMENTS   Evaluation     Self-care/ Home management     Manual therapy 17 -Prone PA mobs to L1-L5 grade III x 30 x 3  -Prone on elbows PA mobs to L1-L5 grade III x 30 x 3   Neuromuscular Re-education     Therapeutic Activity     Therapeutic Exercises 10 -see exercise flow sheet for date completed   Gait training     Modality__________________                Total 27    Blank areas are intentional and mean the treatment did not include these items.       Sera Smith, PT, DPT  3/13/2019

## 2021-06-24 NOTE — TELEPHONE ENCOUNTER
Call from pt to inquire about gabapentin side effects. Pt reports she is taking 300mg gabapentin @ bedtime. Pt has been experiencing diarrhea every afternoon since she began this medication. Pt wondering if she can discontinue this medication since she does not believe this has been helping her. Pt informed she can discontinue this medication since she is at the lowest dose.     Pt has follow-up appt scheduled with Dr. Bland. Pt reports she will stop the medication until she sees provider. Pt urged to call clinic with any questions or concerns in the meantime, or if she would like to move her follow-up appt to an earlier date.

## 2021-06-24 NOTE — PROGRESS NOTES
[facility]   [facility]-Pelvic Initial Evaluation    Patient Name: Miryam Hoang  Date of evaluation: 2/19/2019  Referral Diagnosis: Lumbar radiculopathy  Referring provider: Talon Bland*  Visit Diagnosis:     ICD-10-CM    1. Right lumbar radiculopathy M54.16    2. Generalized muscle weakness M62.81        Assessment:     Per chart review: MRI of the lumbar spine dated 11/7/2018 is personally reviewed and images interpreted and shown to the patient.  It does show a disc protrusion at L3-4 with possible contact to the right L4 nerve root.  At L1-L2 there is a slight increase in right central disc protrusion.  L2-3 there is an unchanged disc protrusion that may compress the left L3 nerve root.  At L4-5 there is unchanged moderate disc bulge with central disc protrusion with contact of bilateral traversing L5 nerve roots.  There is mild to moderate facet arthropathy at L5-S1, L4-5.    Impairments in  pain, posture, ROM, joint mobility, strength, sensory function, ADL's, gait/locomotion and balance  Patient's signs and symptoms are consistent with R L3-4 lumbar radiculopathy. Pt presents with R lumbar spine pain that radiates to her R anterior thigh and lower leg. Her pain started in November 2018 but pt initially reports numbness in her R knee in 2017 that has not resolved. She has + femoral nerve testing, pain with lumbar central PAs, abdominal weakness and centralizes with extension..  The POC is dynamic and will be modified on an ongoing basis.  Barriers to achieving goals as noted in the assessment section may affect outcome.  Prognosis to achieve goals is  good   Pt. is appropriate for skilled PT intervention as outlined in the Plan of Care (POC).  Pt. is a good candidate for skilled PT services to improve pain levels and function.  Plan of care and goals were established in collaboration with patient.     Goals:  Pt. will be independent with home exercise program in : 4 weeks  Pt. will be  able to walk : 30 minutes;for exercise/recreation;with less pain;in other weeks (<3 /10 pain)  Other Weeks:: 8 weeks    Pt will: be able to return to water aerobics with less than 3/10 pain to participate in her previous recreational activities in 8 weeks.  Pt will: be able to sit for >30 min with less than 3/10 pain for work in 8 weeks.      Patient's expectations/goals are realistic.    Barriers to Learning or Achieving Goals:  Chronicity of the problem.       Plan / Patient Instructions:        Plan of Care:   Communication with: Referral Source  Patient Related Instruction: Nature of Condition;Treatment plan and rationale;Self Care instruction;Posture;Precautions;Basis of treatment;Next steps;Body mechanics;Expected outcome  Times per Week: 1-2  Number of Weeks: 8  Number of Visits: 12  Discharge Planning: when goals are met or pt's progress with PT has plateaued  Therapeutic Exercise: ROM;Stretching;Strengthening  Neuromuscular Reeducation: kinesio tape;balance/proprioception;posture;TNE;core  Manual Therapy: soft tissue mobilization;myofascial release;joint mobilization;muscle energy  Modalities: traction;TENS;electrical stimulation;cold pack;hot pack (prn)  Gait Training: to reduce pain and improve function  Equipment: theraband      Plan for next visit: review HEP- progress prone program as tolerated, assess response to steve Jamison reformer     Subjective:         Social information:   Occupation:   Work Status:Working full time      History of Present Illness:    Miryam is a 63 y.o. female who presents to therapy today with complaints of right lumbar spine pain that radiates to her R anterior thigh and lower leg. Date of onset/duration of symptoms is since November 2018. She has a past history of back pain with left leg pain in 2008 that resolved with an injection. In 2017, she tripped over a hose and had numbness into her R leg. She went to [facility] and found it was coming from  "her back. She got an injection at L4 that did not resolve her numbness. In 2018, it became painful and she returned to [place]. She got an injection in L3 that took away the immediate pain. The numbness is still present. In December, the front of her thigh and groin became painful. In January she got another injection in L4 and that did not resolve her pain. Now, the pain starts in the thigh and it progresses into her R lumbar spine. When she left [facility] they told her to wait 4 weeks and then return for another injection. [facility] She tried physical therapy twice at [facility] and she \"did not like it\". It was mostly just mat exercises.     PMH: obesity, depression and anxiety, snoring, postmenopausal status, bilateral chronic knee pain, essential hypertension, chronic left-sided low back with left-sided sciatica     Pain Ratin  Pain rating at best: 2  Pain rating at worst: 10  Pain description: numbness and \"constant pain\"    Functional limitations are described as occurring with:   Stand 30 min  Sit 30 min  Walk 1 hour  Kneel/squat  Change sleeping positions  Water aerobics - has to be able to pull herself up  pilates reformer       Patient reports benefit from:  take medication, stretch         Objective:      Note: Items left blank indicates the item was not performed or not indicated at the time of the evaluation.    Patient Outcome Measures :    Modified Oswestry Low Back Pain Disablity Questionnaire  in %: 34     Scores range from 0-100%, where a score of 0% represents minimal pain and maximal function. The minimal clinically important difference is a score reduction of 12%.    Examination  1. Right lumbar radiculopathy     2. Generalized muscle weakness       Involved side: Right    Lumbar ROM:    Date: 2019     *Indicate scale AROM AROM AROM   Lumbar Flexion To toes - \"feels good\" with + [place]'s sign     Lumbar Extension Mild \" feels " "good\"      Right Left Right Left Right Left   Lumbar Sidebending Mild with slight pain mild       Lumbar Rotation WNL WNL       Thoracic Flexion      Thoracic Extension      Thoracic Sidebending         Thoracic Rotation           Lower Extremity Strength:     Date: 2/19/2019     LE strength/5 Right Left Right Left Right Left   Hip Flexion (L1-3) 5 5       Hip Extension (L5-S1) 4 4+       Hip Abduction (L4-5)         Hip Adduction (L2-3)         Hip External Rotation 3+ 3+       Hip Internal Rotation         Knee Extension (L3-4) 5 5       Knee Flexion         Ankle Dorsiflexion (L4-5) 5 5       Great Toe Extension (L5) 5 5       Ankle Plantar flexion (S1)         Abdominals        Sensation    Did not assess       Reflex Testing  Lumbar Dermatomes Right Left UE Reflexes Right Left   Iliac Crest and Groin (L1)   Biceps (C5-6)     Anterior Medial Thigh (L2)   Brachioradialis (C5-6)     Anterior Thigh, Medial Epicondyle Femur (L3)   Triceps (C7-8)     Lateral Thigh, Anterior Knee, Medial Leg/Malleolus (L4)   Lyudmila s test     Lateral Leg, Dorsal Foot (L5)   LE Reflexes     Lateral Foot (S1)   Patellar (L3-4)     Posterior Leg (S2)   Achilles (S1-2)     Other:   Babinski Response       Palpation: not tender along maciej lumbar paraspinals     Lumbar Special Tests:     Lumbar Special Tests Right Left SI Tests Right  Left   Quadrant test - - SI Compression     Straight leg raise - - SI Distraction     Crossover response   POSH Test     Slump - - Sacral Thrust     Sit-up test  FADIR - -   Trunk extensor endurance test  Scour - -   Prone instability test  Other:FOREST - -   Pubic shotgun  Other:Femoral nerve + -     Repeated Motion Testing:  Standing ext: 10 reps- centralization of pain    Passive Mobility - Joint Integrity:  Hypomobile  tender with central PAs to lumbar spine    LE Screen:  Hip PROM limited into ER/IR maciej but pain-free    Treatment Today     TREATMENT MINUTES COMMENTS   Evaluation 20 -lumbar spine "   Self-care/ Home management     Manual therapy     Neuromuscular Re-education 23 -educated on neurodynamics with regards to space, blood flow and movement required for a healthy nervous system  -see flow sheet for nerve glides  -KTape to R lumbar spine with star pattern over R L4-5, skin prepped and reviewed indications/precautions   Therapeutic Activity     Therapeutic Exercises 15 -see exercise flow sheet  -educated on POC, diagnosis and HEP   Gait training     Modality__________________                Total 58    Blank areas are intentional and mean the treatment did not include these items.     PT Evaluation Code: (Please list factors)  Patient History/Comorbidities: see above  Examination: lumbar spine  Clinical Presentation: stable  Clinical Decision Making: low    Patient History/  Comorbidities Examination  (body structures and functions, activity limitations, and/or participation restrictions) Clinical Presentation Clinical Decision Making (Complexity)   No documented Comorbidities or personal factors 1-2 Elements Stable and/or uncomplicated Low   1-2 documented comorbidities or personal factor 3 Elements Evolving clinical presentation with changing characteristics Moderate   3-4 documented comorbidities or personal factors 4 or more Unstable and unpredictable High                Saray Al, PT, DPT  2/19/2019  7:29 AM

## 2021-06-24 NOTE — PROGRESS NOTES
"[facility] Daily Progress     Patient Name: Miryam Henley  Date: 3/1/2019  Visit #: 3/12  PTA visit #:  na  Insurance:[employer]  Visit Max (if applicable): na  Referral Diagnosis: Lumbar radiculopathy  Referring provider: Talon Bland*  Visit Diagnosis:     ICD-10-CM    1. Right lumbar radiculopathy M54.16    2. Generalized muscle weakness M62.81      Patient Active Problem List   Diagnosis     Depression with anxiety     Severe obesity (BMI 35.0-39.9) with comorbidity (H)     Snoring     Postmenopausal status     Bilateral chronic knee pain     Essential hypertension     Chronic left-sided low back pain with left-sided sciatica       Assessment:     HEP/POC compliance is  good .  Patient demonstrates understanding/independence with home program.  Response to Intervention Continued centralization of pain and tolerated HEP progression.  Patient is benefitting from skilled physical therapy and is making steady progress toward functional goals.  Patient is appropriate to continue with skilled physical therapy intervention, as indicated by initial plan of care.    Goal Status:  Pt. will be independent with home exercise program in : 4 weeks  Pt. will be able to walk : 30 minutes;for exercise/recreation;with less pain;in other weeks (<3 /10 pain)  Other Weeks:: 8 weeks    Pt will: be able to return to water aerobics with less than 3/10 pain to participate in her previous recreational activities in 8 weeks.  Pt will: be able to sit for >30 min with less than 3/10 pain for work in 8 weeks.      Plan / Patient Education:     Continue with initial plan of care.  Progress with home program as tolerated. progress with prone press ups and lifts    Subjective:     Pain Ratin-3 R low back  The front of the leg has improved- the pain is minimal but it is a little \"tight\". The back pain is still there. The pain increased yesterday after sitting for a prolonged period of time. It improved " with stretching and advil.    Objective:     Lumbar AROM:  Flexion: to toes with tightness in at end  Ext: WNL  Rotation: R WNL, L WNL  Lateral flexion: R WNL, L WNL    Exercises:  Exercise #1: standing ext 10x   Comment #1: femoral nerve sliders 15x on R  Exercise #2: quadriped leg extensions- progressed to bird dog 10 x 3-5 sec hold  Comment #2: NuStep 5 min, WL 6.0  Exercise #3: modified plank 4 x until fatigue  Comment #3: standing hip flexor stretch on stair 2 x 30 sec hold B  Exercise #4: prone quad stretch with strap 2 x 30 sec hold B        Treatment Today     TREATMENT MINUTES COMMENTS   Evaluation     Self-care/ Home management     Manual therapy 8 -L SL R lumbar rotational mobilizations, grade III   Neuromuscular Re-education     Therapeutic Activity     Therapeutic Exercises 17 -see exercise flow sheet for date completed  -educated on progress and POC   Gait training     Modality__________________                Total 25    Blank areas are intentional and mean the treatment did not include these items.       Saray Al, PT, DPT  3/1/2019

## 2021-06-25 NOTE — PROGRESS NOTES
Assessment:     Diagnoses and all orders for this visit:    Lumbar radiculopathy    Myofascial pain    Scoliosis of thoracolumbar spine, unspecified scoliosis type    Lumbar facet arthropathy    Primary osteoarthritis of both knees  -     OPS US Large Joint Injection Unilateral       Miryam Hoang is a 63 y.o. y.o. female with past medical history significant for obesity, depression, anxiety, snoring, postmenopausal status, bilateral chronic knee pain, essential hypertension, chronic left-sided low back pain with left sided sciatica who presents today for follow-up regarding low back pain and right lower externally pain:    -Patient continues to have right lower extremity paresthesias, however pain is improved.  She has persistent right lower limb pain.  She also having ongoing right knee pain.  Her right knee pain is likely secondary to osteoarthritis.  Back pain and lower limb paresthesias are likely secondary to L3 or 4 radiculopathy.     Plan:     A shared decision making plan was used. The patient's values and choices were respected. Prior medical records from our last visit on 2/14/2019 as well as physical therapy notes were reviewed today. The following represents what was discussed and decided upon by the provider and the patient.        -DIAGNOSTIC TESTS: Images were personally reviewed and interpreted.   --MRI of the lumbar spine dated 11/7/2018 is personally reviewed and images interpreted and shown to the patient.  It does show a disc protrusion at L3-4 with possible contact to the right L4 nerve root.  At L1-L2 there is a slight increase in right central disc protrusion.  L2-3 there is an unchanged disc protrusion that may compress the left L3 nerve root.  At L4-5 there is unchanged moderate disc bulge with central disc protrusion with contact of bilateral traversing L5 nerve roots.  There is mild to moderate facet arthropathy at L5-S1, L4-5.    -INTERVENTIONS: I have ordered a right  ultrasound-guided knee injection.  Could consider a right L3-4 transfemoral epidural steroid injection in the future.    -MEDICATIONS: Patient is currently taking gabapentin 300 mg at bedtime as well as 600 mg of ibuprofen per day.  I recommend that she in a stepwise fashion increase her gabapentin to 609 100 mg 3 times a day.  I have given her a chart so she can follow this.  -  Discussed side effects of medications and proper use. Patient verbalized understanding.    -PHYSICAL THERAPY: The patient has had 7 sessions of physical therapy.  She continues to do exercise on daily basis.  I recommend that she continues doing this.    Discussed the importance of core strengthening, ROM, stretching exercises with the patient and how each of these entities is important in decreasing pain.  Explained to the patient that the purpose of physical therapy is to teach the patient a home exercise program.  These exercises need to be performed every day in order to decrease pain and prevent future occurrences of pain.        -PATIENT EDUCATION: We discussed importance of continued physical therapy as well as medication management and possible injections.    -FOLLOW UP: Patient will follow-up in 4 weeks.  Advised to contact clinic if symptoms worsen or change.    Subjective:     Miryam Hoang is a 63 y.o. female who presents today for follow-up regarding low back and right lower extremity pain.  Patient reports that her right lower extremity pain is improved with physical therapy exercises, however continues to have some numbness and tingling in the posterior thigh and now anterior shin.  She feels that her physical therapy exercises have been very helpful for her.  She continues to have persistent right lower back pain that is worse with prolonged sitting and standing.  Pain is improved with Advil as well as stretching and walking.  She denies any new symptoms.  She reports that she has continued right knee pain.  Gabapentin at  bedtime 300 mg has not been helpful to this point.  She denies any bowel or bladder changes, fevers, chills, unintentional weight loss.    Evaluation to Date:  MRI of the lumbar spine dated 5/19/2008.  MRI of the lumbar spine dated 11/7/2018.    Treatment to Date: 7 sessions of physical therapy.Epidural steroid injection on 8/8/2008.        Patient Active Problem List   Diagnosis     Depression with anxiety     Severe obesity (BMI 35.0-39.9) with comorbidity (H)     Snoring     Postmenopausal status     Bilateral chronic knee pain     Essential hypertension     Chronic left-sided low back pain with left-sided sciatica       Current Outpatient Medications on File Prior to Encounter   Medication Sig Dispense Refill     aspirin 81 mg chewable tablet Chew 81 mg daily.       calcium-vitamin D (CALCIUM-VITAMIN D) 500 mg(1,250mg) -200 unit per tablet Take 1 tablet by mouth 2 (two) times a day with meals.       citalopram (CELEXA) 20 MG tablet        ergocalciferol (VITAMIN D2) 50,000 unit capsule Take 50,000 Units by mouth once a week.       gabapentin (NEURONTIN) 300 MG capsule Take 1 capsule (300 mg total) by mouth 3 (three) times a day. Increase to 2 tablets three times a day as instructed. (Patient taking differently: Take 300 mg by mouth 3 (three) times a day. Increase to 2 tablets three times a day as instructed.      ) 180 capsule 1     ibuprofen (ADVIL) 200 MG tablet Take 600 mg by mouth daily.       pediatric multivitamin-iron (POLY-VI-SOL WITH IRON) chewable tablet Chew 1 tablet daily.       diclofenac (VOLTAREN) 75 MG EC tablet Take 75 mg by mouth daily as needed.       No current facility-administered medications on file prior to encounter.        No Known Allergies    No past medical history on file.     Review of Systems  ROS:  Specifically negative for bowel/bladder dysfunction, balance changes, headache, dizziness, foot drop, fevers, chills, appetite changes, nausea/vomiting, unexplained weight loss.  Otherwise 13 systems reviewed are negative. Please see the patient's intake questionnaire from today for details.    Reviewed Social, Family, Past Medical and Past Surgical history with patient, no significant changes noted since prior visit.     Objective:     /79   Pulse 65   Temp 97.5  F (36.4  C) (Oral)   Resp 19   Wt (!) 283 lb 8 oz (128.6 kg)   SpO2 92%   BMI 41.56 kg/m      PHYSICAL EXAMINATION:    --CONSTITUTIONAL: Well developed, well nourished, healthy appearing individual.  --PSYCHIATRIC: Appropriate mood and affect. No difficulty interacting due to temper, social withdrawal, or memory issues.  --SKIN: Lumbar region is dry and intact.   --RESPIRATORY: Normal rhythm and effort. No abnormal accessory muscle breathing patterns noted.   --MUSCULOSKELETAL:  Normal lumbar lordosis noted, no lateral shift.  --GROSS MOTOR: Easily arises from a seated position. Gait is non-antalgic  --LUMBAR SPINE:  Inspection reveals no evidence of deformity. Range of motion is not limited in lumbar flexion, extension, lateral rotation. No tenderness to palpation lumbar spine. Straight leg raising in the supine position is negative to radicular pain.   --SACROILIAC JOINT: Negative distraction.  Negative Laya's with reproduction of pain to affected extremity. One Finger point test negative.  --LOWER EXTREMITY MOTOR TESTING:  Plantar flexion left 5/5, right 5/5   Dorsiflexion left 5/5, right 5/5   Great toe MTP extension left 5/5, right 5/5  Knee flexion left 5/5, right 5/5  Knee extension left 5/5, right 5/5   Hip flexion left 5/5, right 5/5  Hip abduction left 5/5, right 5/5  Hip adduction left 5/5, right 5/5   --HIPS: Full range of motion bilaterally. Negative FABERs on the involved lower extremity.   --NEUROLOGIC: Bilateral patellar and achilles reflexes are physiologic and symmetric. Sensation to light touch is intact in the bilateral L4, L5, and S1 dermatomes.    RESULTS:   Imaging: Lumbar spine imaging was  reviewed today.

## 2021-06-25 NOTE — PATIENT INSTRUCTIONS - HE
DISCHARGE INSTRUCTIONS    During office hours (8:00 a.m.- 4:30 p.m.) questions or concerns may be answered  by calling Spine Navigation Nurses at  [phone].     If you experience any problems after hours  please call [phone] and you will be connected to [facility].     All Patients:    ? You may experience an increase in your symptoms for the first 2 days (It may take anywhere between 2 days- 2 weeks for the steroid to have maximum effect).    ? You may use ice on the injection site, as frequently as 20 minutes each hour if needed.    ? You may take your pain medicine.    ? You may continue taking your regular medication after your injection. If you have had a Medial Branch Block you may resume pain medication once your pain diary is completed.    ? You may shower. No swimming, tub bath or hot tub for 48 hours.  You may remove your bandaid/bandage as soon as you are home.    ? You may resume light activities, as tolerated.    ? Resume your usual diet as tolerated.    ? It is strongly advised that you do not drive for 1-3 hours post injection.    ? If you have had oral sedation:  Do not drive for 8 hours post injection.      ? If you have had IV sedation:  Do not drive for 24 hours post injection.  Do not operate hazardous machinery or make important personal/business decisions for 24 hours.      POSSIBLE STEROID SIDE EFFECTS (If steroid/cortisone was used for your procedure)    -If you experience these symptoms, it should only last for a short period      Swelling of the legs                Skin redness (flushing)       Mouth (oral) irritation     Blood sugar (glucose) levels              Sweats                      Mood changes    Headache    Sleeplessness         POSSIBLE PROCEDURE SIDE EFFECTS  -Call the [facility] if you are concerned    Increased Pain             Increased numbness/tingling        Nausea/Vomiting            Bruising/bleeding at site        Redness or swelling                                                 Difficulty walking        Weakness             Fever greater than 100.5    *In the event of a severe headache after an epidural steroid injection that is relieved by lying down, please call the [facility] to speak with a clinical staff member*

## 2021-06-25 NOTE — PATIENT INSTRUCTIONS - HE
I have ordered a right knee ultrasound-guided injection.    Prescribed Gabapentin today, 300 mg tablets, to be titrated up to 3 tablets 3 times a day as tolerated for your nerve pain. Please follow Gabapentin dosing chart below.    Gabapentin 300mg Dosing Chart    DATE  MORNING AFTERNOON BEDTIME                         Day 4 1 0 1    Day 5 1 0 1    Day 6 1 0 1    Day 7 1 1 1    Day 8 1 1 1    Day 9 1 1 1    Day 10 1 1 2    Day 11 1 1 2    Day 12 1 1 2    Day 13 2 1 2    Day 14 2 1 2    Day 15 2 1 2    Day 16 2 2 2    Day 17 2 2 2    Day 18 2 2 2    Day 19 2 2 3    Day 20 2 2 3    Day 21 2 2 3    Day 22 3 2 3    Day 23 3 2 3    Day 24 3 2 3    Day 25 3 3 3    Day 26 3 3 3    Day 27 3 3 3     Continue medication, taking 3 capsules three times daily    Please call if you have any questions regarding how to take your medication  Clinic Phone # [phone]      Please continue physical therapy and doing her exercise on a daily basis.    ~Please call Nurse Navigation line [phone] with any questions or concerns about your treatment plan, if symptoms worsen and you would like to be seen urgently, or if you have problems controlling bladder and bowel function.

## 2021-07-13 ENCOUNTER — RECORDS - HEALTHEAST (OUTPATIENT)
Dept: ADMINISTRATIVE | Facility: CLINIC | Age: 66
End: 2021-07-13

## 2021-07-21 ENCOUNTER — RECORDS - HEALTHEAST (OUTPATIENT)
Dept: ADMINISTRATIVE | Facility: CLINIC | Age: 66
End: 2021-07-21

## 2021-07-22 ENCOUNTER — RECORDS - HEALTHEAST (OUTPATIENT)
Dept: SCHEDULING | Facility: CLINIC | Age: 66
End: 2021-07-22

## 2021-07-22 DIAGNOSIS — Z12.31 OTHER SCREENING MAMMOGRAM: ICD-10-CM

## 2021-07-28 ENCOUNTER — ANCILLARY PROCEDURE (OUTPATIENT)
Dept: MAMMOGRAPHY | Facility: CLINIC | Age: 66
End: 2021-07-28
Attending: FAMILY MEDICINE
Payer: COMMERCIAL

## 2021-07-28 DIAGNOSIS — Z12.31 VISIT FOR SCREENING MAMMOGRAM: ICD-10-CM

## 2021-07-28 PROCEDURE — 77063 BREAST TOMOSYNTHESIS BI: CPT

## 2021-07-30 ENCOUNTER — LAB REQUISITION (OUTPATIENT)
Dept: LAB | Facility: CLINIC | Age: 66
End: 2021-07-30

## 2021-07-30 DIAGNOSIS — I10 ESSENTIAL (PRIMARY) HYPERTENSION: ICD-10-CM

## 2021-07-30 DIAGNOSIS — E55.9 VITAMIN D DEFICIENCY, UNSPECIFIED: ICD-10-CM

## 2021-07-30 LAB
ANION GAP SERPL CALCULATED.3IONS-SCNC: 9 MMOL/L (ref 5–18)
BUN SERPL-MCNC: 13 MG/DL (ref 8–22)
CALCIUM SERPL-MCNC: 9.5 MG/DL (ref 8.5–10.5)
CHLORIDE BLD-SCNC: 106 MMOL/L (ref 98–107)
CO2 SERPL-SCNC: 27 MMOL/L (ref 22–31)
CREAT SERPL-MCNC: 0.82 MG/DL (ref 0.6–1.1)
GFR SERPL CREATININE-BSD FRML MDRD: 76 ML/MIN/1.73M2
GLUCOSE BLD-MCNC: 90 MG/DL (ref 70–125)
POTASSIUM BLD-SCNC: 4.2 MMOL/L (ref 3.5–5)
SODIUM SERPL-SCNC: 142 MMOL/L (ref 136–145)

## 2021-07-30 PROCEDURE — 80048 BASIC METABOLIC PNL TOTAL CA: CPT | Performed by: FAMILY MEDICINE

## 2021-07-30 PROCEDURE — 36415 COLL VENOUS BLD VENIPUNCTURE: CPT | Performed by: FAMILY MEDICINE

## 2021-07-30 PROCEDURE — 82306 VITAMIN D 25 HYDROXY: CPT | Performed by: FAMILY MEDICINE

## 2021-08-02 LAB — DEPRECATED CALCIDIOL+CALCIFEROL SERPL-MC: 38 UG/L (ref 30–80)

## 2021-10-01 ENCOUNTER — OFFICE VISIT (OUTPATIENT)
Dept: PHYSICAL MEDICINE AND REHAB | Facility: CLINIC | Age: 66
End: 2021-10-01
Payer: COMMERCIAL

## 2021-10-01 VITALS — DIASTOLIC BLOOD PRESSURE: 78 MMHG | SYSTOLIC BLOOD PRESSURE: 120 MMHG | HEART RATE: 78 BPM | OXYGEN SATURATION: 98 %

## 2021-10-01 DIAGNOSIS — M41.9 SCOLIOSIS OF THORACOLUMBAR SPINE, UNSPECIFIED SCOLIOSIS TYPE: ICD-10-CM

## 2021-10-01 DIAGNOSIS — M79.18 MYOFASCIAL PAIN: ICD-10-CM

## 2021-10-01 DIAGNOSIS — M54.16 LUMBAR RADICULOPATHY: Primary | ICD-10-CM

## 2021-10-01 PROCEDURE — 99214 OFFICE O/P EST MOD 30 MIN: CPT | Performed by: PAIN MEDICINE

## 2021-10-01 RX ORDER — LISINOPRIL 10 MG/1
TABLET ORAL
COMMUNITY
Start: 2020-10-06

## 2021-10-01 ASSESSMENT — PAIN SCALES - GENERAL: PAINLEVEL: MODERATE PAIN (5)

## 2021-10-01 NOTE — PATIENT INSTRUCTIONS
I have ordered an injection for you.  You will need a  for this injection.    Please wait at least 2 weeks after injection for getting your flu vaccination.    Recommend you keep your physical therapy appointment.    ~Please call Nurse Navigation line [phone] with any questions or concerns about your treatment plan, if symptoms worsen and you would like to be seen urgently, or if you have problems controlling bladder and bowel function.  [facility] Injection Requirements      A  is required for all x-ray(fluoroscopically)-guided injections.      Injection appointments may be cancelled if there are signs/symptoms of an active infection or if the patient is being actively treated with antibiotics (within 7 days) for a diagnosed infection.      Patients may have their steroid injection cancelled if they have had another steroid injection within 2 weeks.      Diabetic patients will have their blood glucose levels checked the day of their injection. The appointment will be rescheduled if the blood glucose level is 300 or higher.      Patients with allergies to cortisone, local anesthetics, iodine, or contrast dye should contact the Spine Center to further discuss these considerations.      Patients scheduled for medial branch block diagnostic injections should refrain from taking pain medication the day of the procedure.  The medial branch block injection appointment will be rescheduled if the patient's pain rating is not 5/10 or greater at the time of the procedure.      Patients taking warfarin/Coumadin will have their INR checked the day of the procedure. The procedure may be rescheduled if the INR is greater than 3.0.      Please contact the Sp[facility] Center ([phone]) if you are taking any prescription blood-thinning medications (warfarin, Plavix, Lovenox, Eliquis, Brilinta, Effient, etc.) as special dosing adjustments may need to be made depending on the type of  injection you are scheduled to receive.      It is recommended that you delay having your steroid injection if you have received a flu shot, shingles, or Covid-19 vaccine within 2 weeks.      Please eat/drink something (can be snack size) before your appointment. Having something in your stomach usually helps.

## 2021-10-01 NOTE — LETTER
10/1/2021         RE: Miryam Hoang  [address] [place] [address]        Dear Colleague,    Thank you for referring your patient, Miryam Hoang, to the [facility] [place]. Please see a copy of my visit note below.      Assessment:     Diagnoses and all orders for this visit:  Lumbar radiculopathy  -     PAIN Transforaminal REGINALD Inj Lumbosacral One Level Left; Future  Myofascial pain  -     PAIN Transforaminal REGINALD Inj Lumbosacral One Level Left; Future  Scoliosis of thoracolumbar spine, unspecified scoliosis type  -     PAIN Transforaminal REGINALD Inj Lumbosacral One Level Left; Future     Miryam Hoang is a 66 year old y.o. female with past medical history significant for obesity, depression, anxiety, snoring, postmenopausal status, bilateral chronic knee pain, essential hypertension, chronic left-sided low back with left-sided sciatica who presents today for follow-up regarding new left lower extremity pain:    -Overall patient's physical exam is reassuring that she has normal strength and reflexes in her lower extremities.  Her pain is likely secondary to left lumbar radiculopathy.     Plan:     A shared decision making plan was used. The patient's values and choices were respected. Prior medical records from our last visit on 4/22/2019 were reviewed today. The following represents what was discussed and decided upon by the provider and the patient.        -DIAGNOSTIC TESTS: Images were personally reviewed and interpreted.   --MRI of the lumbar spine dated 11/7/2018 report is reviewed.  It does show a disc protrusion at L3-4 with possible contact to the right L4 nerve root.  At L1-L2 there is a slight increase in right central disc protrusion.  L2-3 there is an unchanged disc protrusion that may compress the left L3 nerve root.  At L4-5 there is unchanged moderate disc bulge with central disc protrusion with contact of bilateral traversing L5 nerve roots.  There is mild  to moderate facet arthropathy at L5-S1, L4-5.    -INTERVENTIONS: I ordered a left L2-3 transforaminal epidural steroid injection.  She will need a  for this injection.    -MEDICATIONS: We discussed gabapentin which has helped in the past, however she would like to hold and wait to see if other interventions will be helpful first.  -  Discussed side effects of medications and proper use. Patient verbalized understanding.    -PHYSICAL THERAPY: She has physical therapy scheduled for later this month.  I recommend that she keep these appointments.  Discussed the importance of core strengthening, ROM, stretching exercises with the patient and how each of these entities is important in decreasing pain.  Explained to the patient that the purpose of physical therapy is to teach the patient a home exercise program.  These exercises need to be performed every day in order to decrease pain and prevent future occurrences of pain.        -PATIENT EDUCATION: We discussed pain management in a multimodal fashion including physical therapy, medication management, possible future injections.    -FOLLOW UP: Patient will follow up 2 weeks after injections.  Advised to contact clinic if symptoms worsen or change.    Subjective:     Miryam Hoang is a 66 year old female who presents today for follow-up regarding new her left low back and anterior thigh pain.  Patient reports that she has had pain in the left side of her back and leg for the last 4 months.  She is scheduled for physical therapy later in October.  Pain is shooting in nature.  It is difficult to walk and improved if she rests.  Her pain today is 5/10 is worst is 10/10 is best is 1/10.  She denies any bowel or bladder changes, fevers, chills, unintentional weight loss.       Evaluation to Date:  MRI of the lumbar spine dated 5/19/2008.  MRI of the lumbar spine dated 11/7/2018.     Treatment to Date: 7 sessions of physical therapy.Epidural steroid injection on  8/8/2008.  Ultrasound-guided right knee joint injection dated 3/21/2019.    Patient Active Problem List   Diagnosis     Depression with anxiety     Severe obesity (BMI 35.0-39.9) with comorbidity (H)     Snoring     Postmenopausal status     Bilateral chronic knee pain     Essential hypertension     Chronic left-sided low back pain with left-sided sciatica     Benign paroxysmal positional vertigo of left ear       Current Outpatient Medications   Medication     calcium-vitamin D (CALCIUM-VITAMIN D) 500 mg(1,250mg) -200 unit per tablet     citalopram (CELEXA) 20 MG tablet     clobetasol (TEMOVATE) 0.05 % external solution     fluocinolone (DERMA-SMOOTHE) 0.01 % external oil     ibuprofen (ADVIL) 200 MG tablet     ketoconazole (NIZORAL) 2 % shampoo     lisinopril (ZESTRIL) 10 MG tablet     multivit-minerals/ferrous fum (MULTI VITAMIN ORAL)     No current facility-administered medications for this visit.       No Known Allergies    No past medical history on file.     Review of Systems  ROS:  Specifically negative for bowel/bladder dysfunction, balance changes, headache, dizziness, foot drop, fevers, chills, appetite changes, nausea/vomiting, unexplained weight loss. Otherwise 13 systems reviewed are negative. Please see the patient's intake questionnaire from today for details.    Reviewed Social, Family, Past Medical and Past Surgical history with patient, no significant changes noted since prior visit.     Objective:     /78   Pulse 78   SpO2 98%     PHYSICAL EXAMINATION:    --CONSTITUTIONAL: Well developed, well nourished, healthy appearing individual.  --PSYCHIATRIC: Appropriate mood and affect. No difficulty interacting due to temper, social withdrawal, or memory issues.  --SKIN: Lumbar region is dry and intact.   --RESPIRATORY: Normal rhythm and effort. No abnormal accessory muscle breathing patterns noted.   --MUSCULOSKELETAL:  Normal lumbar lordosis noted, no lateral shift.  --GROSS MOTOR: Easily  arises from a seated position. Gait is non-antalgic  --LUMBAR SPINE:  Inspection reveals no evidence of deformity. Range of motion is mildly limited in lumbar flexion, extension, lateral rotation.  Tender palpation over the left low back. Straight leg raising in the seated position is negative to radicular pain. Sciatic notch non-tender.   --SACROILIAC JOINT:  One Finger point test negative.  --LOWER EXTREMITY MOTOR TESTING:  Plantar flexion left 5/5, right 5/5   Dorsiflexion left 5/5, right 5/5   Great toe MTP extension left 5/5, right 5/5  Knee flexion left 5/5, right 5/5  Knee extension left 5/5, right 5/5   Hip flexion left 5/5, right 5/5  Hip abduction left 5/5, right 5/5  Hip adduction left 5/5, right 5/5   --NEUROLOGIC: Decreased sensation to light touch in her right foot.    RESULTS:   Imaging: Lumbar spine imaging was reviewed today.                         Again, thank you for allowing me to participate in the care of your patient.        Sincerely,        Talon Bland, DO

## 2021-10-01 NOTE — PROGRESS NOTES
Assessment:     Diagnoses and all orders for this visit:  Lumbar radiculopathy  -     PAIN Transforaminal REGINALD Inj Lumbosacral One Level Left; Future  Myofascial pain  -     PAIN Transforaminal REGINALD Inj Lumbosacral One Level Left; Future  Scoliosis of thoracolumbar spine, unspecified scoliosis type  -     PAIN Transforaminal REGINALD Inj Lumbosacral One Level Left; Future     Miryam Hoang is a 66 year old y.o. female with past medical history significant for obesity, depression, anxiety, snoring, postmenopausal status, bilateral chronic knee pain, essential hypertension, chronic left-sided low back with left-sided sciatica who presents today for follow-up regarding new left lower extremity pain:    -Overall patient's physical exam is reassuring that she has normal strength and reflexes in her lower extremities.  Her pain is likely secondary to left lumbar radiculopathy.     Plan:     A shared decision making plan was used. The patient's values and choices were respected. Prior medical records from our last visit on 4/22/2019 were reviewed today. The following represents what was discussed and decided upon by the provider and the patient.        -DIAGNOSTIC TESTS: Images were personally reviewed and interpreted.   --MRI of the lumbar spine dated 11/7/2018 report is reviewed.  It does show a disc protrusion at L3-4 with possible contact to the right L4 nerve root.  At L1-L2 there is a slight increase in right central disc protrusion.  L2-3 there is an unchanged disc protrusion that may compress the left L3 nerve root.  At L4-5 there is unchanged moderate disc bulge with central disc protrusion with contact of bilateral traversing L5 nerve roots.  There is mild to moderate facet arthropathy at L5-S1, L4-5.    -INTERVENTIONS: I ordered a left L2-3 transforaminal epidural steroid injection.  She will need a  for this injection.    -MEDICATIONS: We discussed gabapentin which has helped in the past, however she would  like to hold and wait to see if other interventions will be helpful first.  -  Discussed side effects of medications and proper use. Patient verbalized understanding.    -PHYSICAL THERAPY: She has physical therapy scheduled for later this month.  I recommend that she keep these appointments.  Discussed the importance of core strengthening, ROM, stretching exercises with the patient and how each of these entities is important in decreasing pain.  Explained to the patient that the purpose of physical therapy is to teach the patient a home exercise program.  These exercises need to be performed every day in order to decrease pain and prevent future occurrences of pain.        -PATIENT EDUCATION: We discussed pain management in a multimodal fashion including physical therapy, medication management, possible future injections.    -FOLLOW UP: Patient will follow up 2 weeks after injections.  Advised to contact clinic if symptoms worsen or change.    Subjective:     Miryam Hoang is a 66 year old female who presents today for follow-up regarding new her left low back and anterior thigh pain.  Patient reports that she has had pain in the left side of her back and leg for the last 4 months.  She is scheduled for physical therapy later in October.  Pain is shooting in nature.  It is difficult to walk and improved if she rests.  Her pain today is 5/10 is worst is 10/10 is best is 1/10.  She denies any bowel or bladder changes, fevers, chills, unintentional weight loss.       Evaluation to Date:  MRI of the lumbar spine dated 5/19/2008.  MRI of the lumbar spine dated 11/7/2018.     Treatment to Date: 7 sessions of physical therapy.Epidural steroid injection on 8/8/2008.  Ultrasound-guided right knee joint injection dated 3/21/2019.    Patient Active Problem List   Diagnosis     Depression with anxiety     Severe obesity (BMI 35.0-39.9) with comorbidity (H)     Snoring     Postmenopausal status     Bilateral chronic knee pain      Essential hypertension     Chronic left-sided low back pain with left-sided sciatica     Benign paroxysmal positional vertigo of left ear       Current Outpatient Medications   Medication     calcium-vitamin D (CALCIUM-VITAMIN D) 500 mg(1,250mg) -200 unit per tablet     citalopram (CELEXA) 20 MG tablet     clobetasol (TEMOVATE) 0.05 % external solution     fluocinolone (DERMA-SMOOTHE) 0.01 % external oil     ibuprofen (ADVIL) 200 MG tablet     ketoconazole (NIZORAL) 2 % shampoo     lisinopril (ZESTRIL) 10 MG tablet     multivit-minerals/ferrous fum (MULTI VITAMIN ORAL)     No current facility-administered medications for this visit.       No Known Allergies    No past medical history on file.     Review of Systems  ROS:  Specifically negative for bowel/bladder dysfunction, balance changes, headache, dizziness, foot drop, fevers, chills, appetite changes, nausea/vomiting, unexplained weight loss. Otherwise 13 systems reviewed are negative. Please see the patient's intake questionnaire from today for details.    Reviewed Social, Family, Past Medical and Past Surgical history with patient, no significant changes noted since prior visit.     Objective:     /78   Pulse 78   SpO2 98%     PHYSICAL EXAMINATION:    --CONSTITUTIONAL: Well developed, well nourished, healthy appearing individual.  --PSYCHIATRIC: Appropriate mood and affect. No difficulty interacting due to temper, social withdrawal, or memory issues.  --SKIN: Lumbar region is dry and intact.   --RESPIRATORY: Normal rhythm and effort. No abnormal accessory muscle breathing patterns noted.   --MUSCULOSKELETAL:  Normal lumbar lordosis noted, no lateral shift.  --GROSS MOTOR: Easily arises from a seated position. Gait is non-antalgic  --LUMBAR SPINE:  Inspection reveals no evidence of deformity. Range of motion is mildly limited in lumbar flexion, extension, lateral rotation.  Tender palpation over the left low back. Straight leg raising in the seated  position is negative to radicular pain. Sciatic notch non-tender.   --SACROILIAC JOINT:  One Finger point test negative.  --LOWER EXTREMITY MOTOR TESTING:  Plantar flexion left 5/5, right 5/5   Dorsiflexion left 5/5, right 5/5   Great toe MTP extension left 5/5, right 5/5  Knee flexion left 5/5, right 5/5  Knee extension left 5/5, right 5/5   Hip flexion left 5/5, right 5/5  Hip abduction left 5/5, right 5/5  Hip adduction left 5/5, right 5/5   --NEUROLOGIC: Decreased sensation to light touch in her right foot.    RESULTS:   Imaging: Lumbar spine imaging was reviewed today.

## 2021-10-14 ENCOUNTER — HOSPITAL ENCOUNTER (OUTPATIENT)
Dept: PHYSICAL THERAPY | Facility: REHABILITATION | Age: 66
End: 2021-10-14
Payer: COMMERCIAL

## 2021-10-14 DIAGNOSIS — G89.29 CHRONIC BILATERAL LOW BACK PAIN WITH LEFT-SIDED SCIATICA: Primary | ICD-10-CM

## 2021-10-14 DIAGNOSIS — M25.652 DECREASED RANGE OF MOTION OF BOTH HIPS: ICD-10-CM

## 2021-10-14 DIAGNOSIS — M25.651 DECREASED RANGE OF MOTION OF BOTH HIPS: ICD-10-CM

## 2021-10-14 DIAGNOSIS — M54.42 CHRONIC BILATERAL LOW BACK PAIN WITH LEFT-SIDED SCIATICA: Primary | ICD-10-CM

## 2021-10-14 PROCEDURE — 97161 PT EVAL LOW COMPLEX 20 MIN: CPT | Mod: GP | Performed by: PHYSICAL THERAPIST

## 2021-10-14 PROCEDURE — 97140 MANUAL THERAPY 1/> REGIONS: CPT | Mod: GP | Performed by: PHYSICAL THERAPIST

## 2021-10-14 PROCEDURE — 97110 THERAPEUTIC EXERCISES: CPT | Mod: GP | Performed by: PHYSICAL THERAPIST

## 2021-10-14 NOTE — PROGRESS NOTES
10/14/21 0700   General Information   Type of Visit Initial OP Ortho PT Evaluation   Start of Care Date 10/14/21   Referring Physician Direct Access   Certification Required? No   Medical Diagnosis h/o lumbar disc herniation and lumbar radiculitis   Surgical/Medical history reviewed Yes   Precautions/Limitations no known precautions/limitations   Body Part(s)   Body Part(s) Lumbar Spine/SI   Presentation and Etiology   Pertinent history of current problem (include personal factors and/or comorbidities that impact the POC) Pain started 13 years ago with a herniated disc got a shot had been good for a long time. 2019 had a knee replacement on right then 2020 had left knee replacement. After about 2 months the back started bothering her. Started February or March of 2021. Over the last 2 months has gotten progressively worse. She is scheduled for an injection tomorrow. Pain Described as left sided low back achy, down the side of the leg, some pain to the groin area and lateral hip. Can go into the the lateral shin. Some tingling. Does feel some general weakness in the leg. No balance concerns, no change in bowel/bladder. Worse with walking long distance, bending, working in the yard, picking up sticks, standing to cook. Better with bending over. No change with medication   Fall Risk Screen   Fall screen completed by PT   Have you fallen 2 or more times in the past year? No   Have you fallen and had an injury in the past year? No   Is patient a fall risk? No   Abuse Screen (yes response referral indicated)   Feels Unsafe at Home or Work/School no   Feels Threatened by Someone no   Does Anyone Try to Keep You From Having Contact with Others or Doing Things Outside Your Home? no   Physical Signs of Abuse Present no   Lumbar Spine/SI Objective Findings   Gait/Locomotion mild antalgia   Flexion ROM to toes   Extension ROM Mod limit no pin   Right Side Bending ROM some tug midl limtt   Left Side Bending ROM mild limit no  pain   Pelvic Screen - SI pain provocation testing   Hip Screen limit hip ER and IR bilaterally 50-75%   Lumbar/Hip/Knee/Foot Strength Comments 5/5 throughout LE   Quadricep Flexibility limited to 90 deg prone   SLR 90/70   Ely Test -/-   Slump Test -/-   Segmental Mobility mild hypomobility, pain in L4 L5   Sensation Testing intact LE dermatome screen   Palpation tenderness to left sided lumbar paraspinals and QL, tension L vs R   Planned Therapy Interventions   Planned Therapy Interventions joint mobilization;manual therapy;neuromuscular re-education;strengthening;ROM;stretching   Planned Modality Interventions   Planned Modality Interventions TENS   Clinical Impression   Criteria for Skilled Therapeutic Interventions Met yes, treatment indicated   PT Diagnosis low back pain, myofascial pain, lumbar radiculitis, quad tightness, decreased hip ROM   Clinical Presentation Stable/Uncomplicated   Clinical Decision Making (Complexity) Low complexity   Therapy Frequency 1 time/week   Predicted Duration of Therapy Intervention (days/wks) 6   Risk & Benefits of therapy have been explained Yes   Patient, Family & other staff in agreement with plan of care Yes   Clinical Impression Comments Patient is a 66 year old male seen in PT for initial evaluation of left sided low back pain with discomfort into lateral and anterior thigh. The patient has h/o right sided low back pain. She is scheduled for an injection tomorrow. The patient is having difficulty with walking long distance, bending, working in the yard, picking up sticks, standing to cook. With examination, the patient demonstrates tightness in lumbar musculature, L hamstring tightness with possible neural tension, mild hip weakness, quad tightness on L. Patients pain likely related to altered biomechanics following total knee replacement on the left. The patient will likely benefit from skilled PT to improve her mobility, strength, pain, and overall function.    Education Assessment   Preferred Learning Style Reading;Demonstration;Listening;Pictures/video   Barriers to Learning No barriers   Ortho Goal 1   Goal Description patient will be able to walk 2 miles without increased in pain in 6 weeks:   Ortho Goal 2   Goal Description patient will be able to bend over and do yard work for > 30 minutes without increased pain in 5 weeks:   Ortho Goal 3   Goal Description patient will be able to cook and clean in house wihtout increase in pain in 5 weeks:   Total Evaluation Time   PT Eval, Low Complexity Minutes (90821) 15     Yahir Delgado, PT

## 2021-10-15 ENCOUNTER — ANCILLARY PROCEDURE (OUTPATIENT)
Dept: PHYSICAL MEDICINE AND REHAB | Facility: CLINIC | Age: 66
End: 2021-10-15
Attending: PAIN MEDICINE
Payer: COMMERCIAL

## 2021-10-15 VITALS
OXYGEN SATURATION: 97 % | DIASTOLIC BLOOD PRESSURE: 70 MMHG | HEART RATE: 72 BPM | SYSTOLIC BLOOD PRESSURE: 136 MMHG | TEMPERATURE: 97.6 F

## 2021-10-15 DIAGNOSIS — M79.18 MYOFASCIAL PAIN: ICD-10-CM

## 2021-10-15 DIAGNOSIS — M54.16 LUMBAR RADICULOPATHY: ICD-10-CM

## 2021-10-15 DIAGNOSIS — M41.9 SCOLIOSIS OF THORACOLUMBAR SPINE, UNSPECIFIED SCOLIOSIS TYPE: ICD-10-CM

## 2021-10-15 PROCEDURE — 64483 NJX AA&/STRD TFRM EPI L/S 1: CPT | Mod: LT | Performed by: PAIN MEDICINE

## 2021-10-15 RX ORDER — DEXAMETHASONE SODIUM PHOSPHATE 10 MG/ML
INJECTION, SOLUTION INTRAMUSCULAR; INTRAVENOUS
Status: COMPLETED | OUTPATIENT
Start: 2021-10-15 | End: 2021-10-15

## 2021-10-15 RX ORDER — LIDOCAINE HYDROCHLORIDE 10 MG/ML
INJECTION, SOLUTION EPIDURAL; INFILTRATION; INTRACAUDAL; PERINEURAL
Status: COMPLETED | OUTPATIENT
Start: 2021-10-15 | End: 2021-10-15

## 2021-10-15 RX ADMIN — DEXAMETHASONE SODIUM PHOSPHATE 10 MG: 10 INJECTION, SOLUTION INTRAMUSCULAR; INTRAVENOUS at 15:01

## 2021-10-15 RX ADMIN — LIDOCAINE HYDROCHLORIDE 2 ML: 10 INJECTION, SOLUTION EPIDURAL; INFILTRATION; INTRACAUDAL; PERINEURAL at 15:00

## 2021-10-15 ASSESSMENT — PAIN SCALES - GENERAL
PAINLEVEL: NO PAIN (1)
PAINLEVEL: MODERATE PAIN (5)

## 2021-10-15 NOTE — PATIENT INSTRUCTIONS
Please follow up two weeks post procedure with Dr Bland to evaluate your plan of care.       DISCHARGE INSTRUCTIONS    During office hours (8:00 a.m.- 4:00 p.m.) questions or concerns may be answered  by calling Spine Center Navigation Nurses at  [phone].  Messages received after hours will be returned the following business day.      In the case of an emergency, please dial 911 or seek assistance at the nearest Emergency Room/Urgent Care facility.     All Patients:    ? You may experience an increase in your symptoms for the first 2 days (It may take anywhere between 2 days- 2 weeks for the steroid to have maximum effect).    ? You may use ice on the injection site, as frequently as 20 minutes each hour if needed.    ? You may take your pain medicine.    ? You may continue taking your regular medication after your injection. If you have had a Medial Branch Block you may resume pain medication once your pain diary is completed.    ? You may shower. No swimming, tub bath or hot tub for 48 hours.  You may remove your bandaid/bandage as soon as you are home.    ? You may resume light activities, as tolerated.    ? Resume your usual diet as tolerated.    ? It is strongly advised that you do not drive for 1-3 hours post injection.    ? If you have had oral sedation:  Do not drive for 8 hours post injection.      ? If you have had IV sedation:  Do not drive for 24 hours post injection.  Do not operate hazardous machinery or make important personal/business decisions for 24 hours.      POSSIBLE STEROID SIDE EFFECTS (If steroid/cortisone was used for your procedure)    -If you experience these symptoms, it should only last for a short period      Swelling of the legs                Skin redness (flushing)       Mouth (oral) irritation     Blood sugar (glucose) levels              Sweats                      Mood changes    Headache    Sleeplessness    Weakened immune system for up to 14 days, which could increase  the risk of janet the COVID-19 virus and/or experiencing more severe symptoms of the disease, if exposed.    Decreased effectiveness of the flu vaccine if given within 2 weeks of the steroid.         POSSIBLE PROCEDURE SIDE EFFECTS  -Call the Spine Center if you are concerned    Increased Pain             Increased numbness/tingling        Nausea/Vomiting            Bruising/bleeding at site        Redness or swelling                                                Difficulty walking        Weakness             Fever greater than 100.5    *In the event of a severe headache after an epidural steroid injection that is relieved by lying down, please call the [facility] to speak with a clinical staff member*

## 2021-11-09 ENCOUNTER — HOSPITAL ENCOUNTER (OUTPATIENT)
Dept: PHYSICAL THERAPY | Facility: REHABILITATION | Age: 66
End: 2021-11-09
Payer: COMMERCIAL

## 2021-11-09 DIAGNOSIS — G89.29 CHRONIC BILATERAL LOW BACK PAIN WITH LEFT-SIDED SCIATICA: Primary | ICD-10-CM

## 2021-11-09 DIAGNOSIS — M25.651 DECREASED RANGE OF MOTION OF BOTH HIPS: ICD-10-CM

## 2021-11-09 DIAGNOSIS — M54.42 CHRONIC BILATERAL LOW BACK PAIN WITH LEFT-SIDED SCIATICA: Primary | ICD-10-CM

## 2021-11-09 DIAGNOSIS — M25.652 DECREASED RANGE OF MOTION OF BOTH HIPS: ICD-10-CM

## 2021-11-09 PROCEDURE — 97110 THERAPEUTIC EXERCISES: CPT | Mod: GP | Performed by: PHYSICAL THERAPIST

## 2021-11-09 PROCEDURE — 97140 MANUAL THERAPY 1/> REGIONS: CPT | Mod: GP | Performed by: PHYSICAL THERAPIST

## 2021-11-10 ENCOUNTER — OFFICE VISIT (OUTPATIENT)
Dept: PHYSICAL MEDICINE AND REHAB | Facility: CLINIC | Age: 66
End: 2021-11-10
Payer: COMMERCIAL

## 2021-11-10 VITALS — DIASTOLIC BLOOD PRESSURE: 67 MMHG | SYSTOLIC BLOOD PRESSURE: 140 MMHG | OXYGEN SATURATION: 94 % | HEART RATE: 67 BPM

## 2021-11-10 DIAGNOSIS — M79.18 MYOFASCIAL PAIN: ICD-10-CM

## 2021-11-10 DIAGNOSIS — M41.9 SCOLIOSIS OF THORACOLUMBAR SPINE, UNSPECIFIED SCOLIOSIS TYPE: ICD-10-CM

## 2021-11-10 DIAGNOSIS — M54.16 LUMBAR RADICULOPATHY: Primary | ICD-10-CM

## 2021-11-10 PROCEDURE — 99213 OFFICE O/P EST LOW 20 MIN: CPT | Performed by: PAIN MEDICINE

## 2021-11-10 ASSESSMENT — PAIN SCALES - GENERAL: PAINLEVEL: MILD PAIN (3)

## 2021-11-10 NOTE — PROGRESS NOTES
Assessment:     Diagnoses and all orders for this visit:  Lumbar radiculopathy  Myofascial pain  Scoliosis of thoracolumbar spine, unspecified scoliosis type     Miryam Hoang is a 66 year old y.o. female with past medical history significant for obesity, depression, anxiety, snoring, postmenopausal status, bilateral chronic knee pain, essential hypertension, chronic left-sided low back with left-sided sciatica who presents today for follow-up regarding left lower extremity pain:    -Patient received 100% relief for 1 week and now 75% relief from her injection. Her pain is likely secondary to lumbar radiculopathy.     Plan:     A shared decision making plan was used. The patient's values and choices were respected. Prior medical records from our last visit on 10/1/2021 were reviewed today. The following represents what was discussed and decided upon by the provider and the patient.        -DIAGNOSTIC TESTS: Images were personally reviewed and interpreted.   --MRI of the lumbar spine dated 11/7/2018 report is reviewed.  It does show a disc protrusion at L3-4 with possible contact to the right L4 nerve root.  At L1-L2 there is a slight increase in right central disc protrusion.  L2-3 there is an unchanged disc protrusion that may compress the left L3 nerve root.  At L4-5 there is unchanged moderate disc bulge with central disc protrusion with contact of bilateral traversing L5 nerve roots.  There is mild to moderate facet arthropathy at L5-S1, L4-5.    -INTERVENTIONS: No interventions at this time. Could consider repeat left L2-3 transforaminal epidural steroid injection should pain worsen or return.    -MEDICATIONS: I recommend ibuprofen and/or Tylenol as needed.  -  Discussed side effects of medications and proper use. Patient verbalized understanding.    -PHYSICAL THERAPY: She continues in physical therapy. I recommend that she go to her next for scheduled appointments.  Discussed the importance of core  strengthening, ROM, stretching exercises with the patient and how each of these entities is important in decreasing pain.  Explained to the patient that the purpose of physical therapy is to teach the patient a home exercise program.  These exercises need to be performed every day in order to decrease pain and prevent future occurrences of pain.         -PATIENT EDUCATION: We discussed pain management in a multimodal fashion including physical therapy, medication management, possible future injections.    -FOLLOW UP: Patient will follow up in 6 weeks.  Advised to contact clinic if symptoms worsen or change.    Subjective:     Miryam Hoang is a 66 year old female who presents today for follow-up regarding low back and left lower extremity pain. Patient reports that she received 100% relief with her injection for the first week and now 75% relief. She notes pain in her left low back and groin area which is worse with standing and walking improved with sitting and laying down. Her pain today is 3/10 is worst 8/10 is best is 1/10. Exercising Tylenol and ibuprofen are somewhat helpful. She is doing physical therapy and finds that it is helpful especially manual therapies. She notes that she is also doing home exercises which is somewhat helpful. She denies any bowel or bladder changes, fevers, chills, unintentional weight loss.    Evaluation to Date:  MRI of the lumbar spine dated 5/19/2008.  MRI of the lumbar spine dated 11/7/2018.     Treatment to Date: 7 sessions of physical therapy.Epidural steroid injection on 8/8/2008.  Ultrasound-guided right knee joint injection dated 3/21/2019.  Left L2-3 transforaminal epidural steroid injection done on 10/15/2021.    Patient Active Problem List   Diagnosis     Depression with anxiety     Severe obesity (BMI 35.0-39.9) with comorbidity (H)     Snoring     Postmenopausal status     Bilateral chronic knee pain     Essential hypertension     Chronic left-sided low back pain  with left-sided sciatica     Benign paroxysmal positional vertigo of left ear       Current Outpatient Medications   Medication     ibuprofen (ADVIL) 200 MG tablet     aspirin (ASA) 81 MG EC tablet     calcium-vitamin D (CALCIUM-VITAMIN D) 500 mg(1,250mg) -200 unit per tablet     citalopram (CELEXA) 20 MG tablet     clobetasol (TEMOVATE) 0.05 % external solution     fluocinolone (DERMA-SMOOTHE) 0.01 % external oil     ketoconazole (NIZORAL) 2 % shampoo     lisinopril (ZESTRIL) 10 MG tablet     multivit-minerals/ferrous fum (MULTI VITAMIN ORAL)     No current facility-administered medications for this visit.       No Known Allergies    No past medical history on file.     Review of Systems  ROS:  Specifically negative for bowel/bladder dysfunction, balance changes, headache, dizziness, foot drop, fevers, chills, appetite changes, nausea/vomiting, unexplained weight loss. Otherwise 13 systems reviewed are negative. Please see the patient's intake questionnaire from today for details.    Reviewed Social, Family, Past Medical and Past Surgical history with patient, no significant changes noted since prior visit.     Objective:     BP (!) 140/67 (BP Location: Right arm, Patient Position: Sitting)   Pulse 67   SpO2 94%     PHYSICAL EXAMINATION:    --CONSTITUTIONAL: Well developed, well nourished, healthy appearing individual.  --PSYCHIATRIC: Appropriate mood and affect. No difficulty interacting due to temper, social withdrawal, or memory issues.  --SKIN: Lumbar region is dry and intact.   --RESPIRATORY: Normal rhythm and effort. No abnormal accessory muscle breathing patterns noted.   --MUSCULOSKELETAL:  Normal lumbar lordosis noted, no lateral shift.  --GROSS MOTOR: Easily arises from a seated position. Gait is non-antalgic  --LUMBAR SPINE:  Inspection reveals no evidence of deformity. Range of motion is not limited in lumbar flexion, extension, lateral rotation. Tenderness palpation of the left low back. Straight leg  raising in the supine position is negative to radicular pain. Sciatic notch non-tender.   --SACROILIAC JOINT:  One Finger point test negative.  --LOWER EXTREMITY MOTOR TESTING:  Plantar flexion left 5/5, right 5/5   Dorsiflexion left 5/5, right 5/5   Great toe MTP extension left 5/5, right 5/5  Knee flexion left 5/5, right 5/5  Knee extension left 5/5, right 5/5   Hip flexion left 5/5, right 5/5  Hip abduction left 5/5, right 5/5  Hip adduction left 5/5, right 5/5   --NEUROLOGIC:  Sensation to light touch is intact in the bilateral L4, L5, and S1 dermatomes.    RESULTS:   Imaging: Lumbar spine imaging was reviewed today. The images were shown to the patient and the findings were explained using a spine model.    PAIN Transforaminal REGINALD Inj Lumbosacral One Level Left    Result Date: 10/15/2021  LUMBAR EPIDURAL STEROID INJECTION TRANSFORAMINAL APPROACH WITH FLUOROSCOPIC GUIDANCE Performed on: 10/15/21 Pre Procedure Diagnosis:  LBP, Lumbar radiculitis Post Procedure Diagnosis:  Same Procedure Performed:  Lumbar Transforaminal Epidural Steroid Injection with Fluoroscopic Guidance Clinical Scenario:  As per office notes Anesthesia/Fluids:  As per intra-procedure documentation Vital Signs:  As per intra-procedure documentation Level Injected: L2-3 Side Injected: Left CC: Miryam Hoang is a 66 year-old female who presents today for a left L2-3 transforaminal epidural steroid injection as ordered by Dr. Bland.  The patient complains of low back buttock and anterior thigh pain on the left.   Lumbar MRI was reviewed today and shows L2-3 disc protrusion.  The patient wanted to proceed with the injection today.  The patient denies feeling ill today or having an active infection (denies taking antibiotics).  The patient does not take any prescription blood thinning medications.  The patient denies any allergies to contrast.    The procedure of epidural steroid injection was discussed in detail along with the attendant  risks, including but not limited to: infection, bleeding, nerve injury, paralysis.   The patient's questions were answered and they understood the information given.   The patient elected to proceed and signed informed consent.  . The patient was placed in a prone position on the fluoroscopy table. A procedural pause was conducted to verify: correct patient identity, procedure to be performed and as applicable, correct side and site, correct patient position, and availability of implants, special equipment and special requirements.  The lower back was prepped and draped in usual fashion.  After anesthetizing the skin with 1% lidocaine, a 3.5 inch, 22 gauge needle was introduced at the Left L2 pedicle under fluoroscopic guidance.  After aspiration was negative, 1 mL of  Omnipaque 300 was injected under continuous fluoroscopy.  Epidural flow without vascular uptake was seen.  1 mL of 1% lidocaine was injected as a test dose. After an appropriate period of observation, a directed neurological exam was performed which revealed no new neurologic deficits.    Subsequently, 10 mgs of Dexamethasone was slowly injected. Following the injection the needle was withdrawn slightly and flushed with local anesthetic as it was fully extracted.  The patient tolerated the procedure well and there were no apparent complications.  The patient did not develop any new neurologic deficits.  After appropriate observation, the patient was dismissed in good condition under their own power. PRE-PROCEDURE PAIN SCORE:  5/10 POST-PROCEDURE PAIN SCORE:  1/10 The patient tolerated the procedure well.  The patient was instructed to call the [facility] if any questions or concerns arise after the procedure. After a short period of observation the patient was discharged.  The patient will follow up with Dr. Bland in 2 to 4 weeks.

## 2021-11-10 NOTE — LETTER
11/10/2021         RE: Miryam Hoang  [address] [place] [address]        Dear Colleague,    Thank you for referring your patient, Miryam Hoang, to the [facility] [place]. Please see a copy of my visit note below.      Assessment:     Diagnoses and all orders for this visit:  Lumbar radiculopathy  Myofascial pain  Scoliosis of thoracolumbar spine, unspecified scoliosis type     Miryam Hoang is a 66 year old y.o. female with past medical history significant for obesity, depression, anxiety, snoring, postmenopausal status, bilateral chronic knee pain, essential hypertension, chronic left-sided low back with left-sided sciatica who presents today for follow-up regarding left lower extremity pain:    -Patient received 100% relief for 1 week and now 75% relief from her injection. Her pain is likely secondary to lumbar radiculopathy.     Plan:     A shared decision making plan was used. The patient's values and choices were respected. Prior medical records from our last visit on 10/1/2021 were reviewed today. The following represents what was discussed and decided upon by the provider and the patient.        -DIAGNOSTIC TESTS: Images were personally reviewed and interpreted.   --MRI of the lumbar spine dated 11/7/2018 report is reviewed.  It does show a disc protrusion at L3-4 with possible contact to the right L4 nerve root.  At L1-L2 there is a slight increase in right central disc protrusion.  L2-3 there is an unchanged disc protrusion that may compress the left L3 nerve root.  At L4-5 there is unchanged moderate disc bulge with central disc protrusion with contact of bilateral traversing L5 nerve roots.  There is mild to moderate facet arthropathy at L5-S1, L4-5.    -INTERVENTIONS: No interventions at this time. Could consider repeat left L2-3 transforaminal epidural steroid injection should pain worsen or return.    -MEDICATIONS: I recommend ibuprofen and/or Tylenol  as needed.  -  Discussed side effects of medications and proper use. Patient verbalized understanding.    -PHYSICAL THERAPY: She continues in physical therapy. I recommend that she go to her next for scheduled appointments.  Discussed the importance of core strengthening, ROM, stretching exercises with the patient and how each of these entities is important in decreasing pain.  Explained to the patient that the purpose of physical therapy is to teach the patient a home exercise program.  These exercises need to be performed every day in order to decrease pain and prevent future occurrences of pain.         -PATIENT EDUCATION: We discussed pain management in a multimodal fashion including physical therapy, medication management, possible future injections.    -FOLLOW UP: Patient will follow up in 6 weeks.  Advised to contact clinic if symptoms worsen or change.    Subjective:     Miryam Hoang is a 66 year old female who presents today for follow-up regarding low back and left lower extremity pain. Patient reports that she received 100% relief with her injection for the first week and now 75% relief. She notes pain in her left low back and groin area which is worse with standing and walking improved with sitting and laying down. Her pain today is 3/10 is worst 8/10 is best is 1/10. Exercising Tylenol and ibuprofen are somewhat helpful. She is doing physical therapy and finds that it is helpful especially manual therapies. She notes that she is also doing home exercises which is somewhat helpful. She denies any bowel or bladder changes, fevers, chills, unintentional weight loss.    Evaluation to Date:  MRI of the lumbar spine dated 5/19/2008.  MRI of the lumbar spine dated 11/7/2018.     Treatment to Date: 7 sessions of physical therapy.Epidural steroid injection on 8/8/2008.  Ultrasound-guided right knee joint injection dated 3/21/2019.  Left L2-3 transforaminal epidural steroid injection done on  10/15/2021.    Patient Active Problem List   Diagnosis     Depression with anxiety     Severe obesity (BMI 35.0-39.9) with comorbidity (H)     Snoring     Postmenopausal status     Bilateral chronic knee pain     Essential hypertension     Chronic left-sided low back pain with left-sided sciatica     Benign paroxysmal positional vertigo of left ear       Current Outpatient Medications   Medication     ibuprofen (ADVIL) 200 MG tablet     aspirin (ASA) 81 MG EC tablet     calcium-vitamin D (CALCIUM-VITAMIN D) 500 mg(1,250mg) -200 unit per tablet     citalopram (CELEXA) 20 MG tablet     clobetasol (TEMOVATE) 0.05 % external solution     fluocinolone (DERMA-SMOOTHE) 0.01 % external oil     ketoconazole (NIZORAL) 2 % shampoo     lisinopril (ZESTRIL) 10 MG tablet     multivit-minerals/ferrous fum (MULTI VITAMIN ORAL)     No current facility-administered medications for this visit.       No Known Allergies    No past medical history on file.     Review of Systems  ROS:  Specifically negative for bowel/bladder dysfunction, balance changes, headache, dizziness, foot drop, fevers, chills, appetite changes, nausea/vomiting, unexplained weight loss. Otherwise 13 systems reviewed are negative. Please see the patient's intake questionnaire from today for details.    Reviewed Social, Family, Past Medical and Past Surgical history with patient, no significant changes noted since prior visit.     Objective:     BP (!) 140/67 (BP Location: Right arm, Patient Position: Sitting)   Pulse 67   SpO2 94%     PHYSICAL EXAMINATION:    --CONSTITUTIONAL: Well developed, well nourished, healthy appearing individual.  --PSYCHIATRIC: Appropriate mood and affect. No difficulty interacting due to temper, social withdrawal, or memory issues.  --SKIN: Lumbar region is dry and intact.   --RESPIRATORY: Normal rhythm and effort. No abnormal accessory muscle breathing patterns noted.   --MUSCULOSKELETAL:  Normal lumbar lordosis noted, no lateral  shift.  --GROSS MOTOR: Easily arises from a seated position. Gait is non-antalgic  --LUMBAR SPINE:  Inspection reveals no evidence of deformity. Range of motion is not limited in lumbar flexion, extension, lateral rotation. Tenderness palpation of the left low back. Straight leg raising in the supine position is negative to radicular pain. Sciatic notch non-tender.   --SACROILIAC JOINT:  One Finger point test negative.  --LOWER EXTREMITY MOTOR TESTING:  Plantar flexion left 5/5, right 5/5   Dorsiflexion left 5/5, right 5/5   Great toe MTP extension left 5/5, right 5/5  Knee flexion left 5/5, right 5/5  Knee extension left 5/5, right 5/5   Hip flexion left 5/5, right 5/5  Hip abduction left 5/5, right 5/5  Hip adduction left 5/5, right 5/5   --NEUROLOGIC:  Sensation to light touch is intact in the bilateral L4, L5, and S1 dermatomes.    RESULTS:   Imaging: Lumbar spine imaging was reviewed today. The images were shown to the patient and the findings were explained using a spine model.    PAIN Transforaminal REGINALD Inj Lumbosacral One Level Left    Result Date: 10/15/2021  LUMBAR EPIDURAL STEROID INJECTION TRANSFORAMINAL APPROACH WITH FLUOROSCOPIC GUIDANCE Performed on: 10/15/21 Pre Procedure Diagnosis:  LBP, Lumbar radiculitis Post Procedure Diagnosis:  Same Procedure Performed:  Lumbar Transforaminal Epidural Steroid Injection with Fluoroscopic Guidance Clinical Scenario:  As per office notes Anesthesia/Fluids:  As per intra-procedure documentation Vital Signs:  As per intra-procedure documentation Level Injected: L2-3 Side Injected: Left CC: Miryam Hoang is a 66 year-old female who presents today for a left L2-3 transforaminal epidural steroid injection as ordered by Dr. Bland.  The patient complains of low back buttock and anterior thigh pain on the left.   Lumbar MRI was reviewed today and shows L2-3 disc protrusion.  The patient wanted to proceed with the injection today.  The patient denies feeling ill  today or having an active infection (denies taking antibiotics).  The patient does not take any prescription blood thinning medications.  The patient denies any allergies to contrast.    The procedure of epidural steroid injection was discussed in detail along with the attendant risks, including but not limited to: infection, bleeding, nerve injury, paralysis.   The patient's questions were answered and they understood the information given.   The patient elected to proceed and signed informed consent.  . The patient was placed in a prone position on the fluoroscopy table. A procedural pause was conducted to verify: correct patient identity, procedure to be performed and as applicable, correct side and site, correct patient position, and availability of implants, special equipment and special requirements.  The lower back was prepped and draped in usual fashion.  After anesthetizing the skin with 1% lidocaine, a 3.5 inch, 22 gauge needle was introduced at the Left L2 pedicle under fluoroscopic guidance.  After aspiration was negative, 1 mL of  Omnipaque 300 was injected under continuous fluoroscopy.  Epidural flow without vascular uptake was seen.  1 mL of 1% lidocaine was injected as a test dose. After an appropriate period of observation, a directed neurological exam was performed which revealed no new neurologic deficits.    Subsequently, 10 mgs of Dexamethasone was slowly injected. Following the injection the needle was withdrawn slightly and flushed with local anesthetic as it was fully extracted.  The patient tolerated the procedure well and there were no apparent complications.  The patient did not develop any new neurologic deficits.  After appropriate observation, the patient was dismissed in good condition under their own power. PRE-PROCEDURE PAIN SCORE:  5/10 POST-PROCEDURE PAIN SCORE:  1/10 The patient tolerated the procedure well.  The patient was instructed to call the [facility] if any  questions or concerns arise after the procedure. After a short period of observation the patient was discharged.  The patient will follow up with Dr. Bland in 2 to 4 weeks.                              Again, thank you for allowing me to participate in the care of your patient.        Sincerely,        Talon Bland, DO

## 2021-11-10 NOTE — PATIENT INSTRUCTIONS
I recommend that you continue with physical therapy at this time.    ~Please call Nurse Navigation line [phone] with any questions or concerns about your treatment plan, if symptoms worsen and you would like to be seen urgently, or if you have problems controlling bladder and bowel function.

## 2021-11-30 ENCOUNTER — HOSPITAL ENCOUNTER (OUTPATIENT)
Dept: PHYSICAL THERAPY | Facility: REHABILITATION | Age: 66
End: 2021-11-30
Payer: COMMERCIAL

## 2021-11-30 DIAGNOSIS — M25.652 DECREASED RANGE OF MOTION OF BOTH HIPS: ICD-10-CM

## 2021-11-30 DIAGNOSIS — M54.42 CHRONIC BILATERAL LOW BACK PAIN WITH LEFT-SIDED SCIATICA: Primary | ICD-10-CM

## 2021-11-30 DIAGNOSIS — G89.29 CHRONIC BILATERAL LOW BACK PAIN WITH LEFT-SIDED SCIATICA: Primary | ICD-10-CM

## 2021-11-30 DIAGNOSIS — M25.651 DECREASED RANGE OF MOTION OF BOTH HIPS: ICD-10-CM

## 2021-11-30 PROCEDURE — 97110 THERAPEUTIC EXERCISES: CPT | Mod: GP | Performed by: PHYSICAL THERAPIST

## 2021-11-30 PROCEDURE — 97140 MANUAL THERAPY 1/> REGIONS: CPT | Mod: GP | Performed by: PHYSICAL THERAPIST

## 2021-11-30 NOTE — DISCHARGE INSTRUCTIONS
BENT OVER ROW    Hold one end of the elastic band in each hand. Lay the band across the floor and step on it with feet shoulder width apart. Hinge at the hips to maintain a quarter squat.     Pull your hands towards your hips, squeezing the shoulder blades together. Do not let your shoulder rise up towards your elbows. Slowly return to starting position and repeat.     2-3 sets  10-15 repetitions  1X/day        Banded deadlift    Tie band in loop that is about 12-16in in diameter. Put the loop around both feet and hold with both hands about shoulder width apart. Separate feet so they are just over shoulder width apart.   Lower down with control by pushing hips back, only allowing a slight bend in the knees. Keep your whole back in a neutral/straight position (have your shoulder blades set so that you are not pulling with your arms). Straighten up by using the glutei muscles so that your are standing full straight. Then Repeat.     2-3 sets  10-15 repetitions  1X/day

## 2022-01-06 NOTE — PROGRESS NOTES
[facility]    Outpatient Physical Therapy Discharge Note  Patient: Miryam Hoang  : 1955    Beginning/End Dates of Reporting Period:  10/14/21 to 21    Referring Provider: Dr. Bland, DO    Therapy Diagnosis: low back pain, myofascial pain, lumbar radiculitis, quad tightness, decreased hip ROM     Client Self Report: Still having some pain, about 75% better overall since the injection. Doing a lot of work around the house, limited to about 20 minutes because of the pain.     Objective Measurements:  Objective Measure: Lumbar ROM  Details: Flexion to floor no pain, decreased L side bending ROM, rotation appears WNL, extension is WNL  Objective Measure: Posture  Details: Slight shift to the R  Objective Measure: Lumbar mobility  Details: Pain L4 centrally, pain L facet joints L3-L5      Goals:  Goal Identifier Walking   Goal Description patient will be able to walk 2 miles without increased in pain in 6 weeks:   Target Date 21   Date Met      Progress (detail required for progress note):  Limited to 20 min     Goal Identifier Yard Work   Goal Description patient will be able to bend over and do yard work for > 30 minutes without increased pain in 5 weeks:   Target Date 21   Date Met      Progress (detail required for progress note):  Has not done much of, doing a lot aroudn the house, 20 min limit     Goal Identifier House work   Goal Description patient will be able to cook and clean in house wihtout increase in pain in 5 weeks:   Target Date 21   Date Met      Progress (detail required for progress note):  Improved to 20 min         Plan:  Discharge from therapy.    Discharge:    Reason for Discharge: Patient has met all goals.  Patient chooses to discontinue therapy.  Patient has failed to schedule further appointments.    Equipment Issued: NA    Discharge Plan: Patient to  continue home program.    Yahir Delgado, PT

## 2022-01-06 NOTE — ADDENDUM NOTE
Encounter addended by: Yahir Delgado, PT on: 1/6/2022 8:29 AM   Actions taken: Episode resolved, Clinical Note Signed

## 2022-01-19 ENCOUNTER — LAB REQUISITION (OUTPATIENT)
Dept: LAB | Facility: CLINIC | Age: 67
End: 2022-01-19

## 2022-01-19 DIAGNOSIS — R39.15 URGENCY OF URINATION: ICD-10-CM

## 2022-01-19 DIAGNOSIS — E55.9 VITAMIN D DEFICIENCY, UNSPECIFIED: ICD-10-CM

## 2022-01-19 DIAGNOSIS — I10 ESSENTIAL (PRIMARY) HYPERTENSION: ICD-10-CM

## 2022-01-19 DIAGNOSIS — Z13.220 ENCOUNTER FOR SCREENING FOR LIPOID DISORDERS: ICD-10-CM

## 2022-01-19 LAB
ANION GAP SERPL CALCULATED.3IONS-SCNC: 13 MMOL/L (ref 5–18)
BUN SERPL-MCNC: 12 MG/DL (ref 8–22)
CALCIUM SERPL-MCNC: 9.7 MG/DL (ref 8.5–10.5)
CHLORIDE BLD-SCNC: 104 MMOL/L (ref 98–107)
CHOLEST SERPL-MCNC: 201 MG/DL
CO2 SERPL-SCNC: 23 MMOL/L (ref 22–31)
CREAT SERPL-MCNC: 0.81 MG/DL (ref 0.6–1.1)
GFR SERPL CREATININE-BSD FRML MDRD: 81 ML/MIN/1.73M2
GLUCOSE BLD-MCNC: 77 MG/DL (ref 70–125)
HDLC SERPL-MCNC: 61 MG/DL
LDLC SERPL CALC-MCNC: 124 MG/DL
POTASSIUM BLD-SCNC: 4.2 MMOL/L (ref 3.5–5)
SODIUM SERPL-SCNC: 140 MMOL/L (ref 136–145)
TRIGL SERPL-MCNC: 78 MG/DL

## 2022-01-19 PROCEDURE — 82306 VITAMIN D 25 HYDROXY: CPT | Performed by: FAMILY MEDICINE

## 2022-01-19 PROCEDURE — 80048 BASIC METABOLIC PNL TOTAL CA: CPT | Performed by: FAMILY MEDICINE

## 2022-01-19 PROCEDURE — 87086 URINE CULTURE/COLONY COUNT: CPT | Performed by: FAMILY MEDICINE

## 2022-01-19 PROCEDURE — 80061 LIPID PANEL: CPT | Performed by: FAMILY MEDICINE

## 2022-01-20 LAB
BACTERIA UR CULT: NO GROWTH
DEPRECATED CALCIDIOL+CALCIFEROL SERPL-MC: 41 UG/L (ref 30–80)

## 2022-02-05 ENCOUNTER — HEALTH MAINTENANCE LETTER (OUTPATIENT)
Age: 67
End: 2022-02-05

## 2022-03-14 ENCOUNTER — TELEPHONE (OUTPATIENT)
Dept: PHYSICAL MEDICINE AND REHAB | Facility: CLINIC | Age: 67
End: 2022-03-14
Payer: COMMERCIAL

## 2022-03-14 NOTE — TELEPHONE ENCOUNTER
Message sent from scheduling that pt interested in another injection. Call placed to pt to discuss.     Pt last had Left L2-3 TFESI on 10/15/2021.     Left message to return call.

## 2022-03-14 NOTE — TELEPHONE ENCOUNTER
Pt returned call. Pt reports her Left L2-3 TFESI she had back on 10/15/2021 only lasted her 1 week. She has continued to do physical therapy and has been doing her home exercises but still feels her pain level is not comfortable.     Advised follow-up appt with Dr. Bland to be re-evaluated and discuss next steps. Transferred to scheduling to make appt.

## 2022-03-15 ENCOUNTER — OFFICE VISIT (OUTPATIENT)
Dept: PHYSICAL MEDICINE AND REHAB | Facility: CLINIC | Age: 67
End: 2022-03-15
Payer: COMMERCIAL

## 2022-03-15 VITALS — OXYGEN SATURATION: 98 % | DIASTOLIC BLOOD PRESSURE: 80 MMHG | HEART RATE: 64 BPM | SYSTOLIC BLOOD PRESSURE: 134 MMHG

## 2022-03-15 DIAGNOSIS — M41.9 SCOLIOSIS OF THORACOLUMBAR SPINE, UNSPECIFIED SCOLIOSIS TYPE: ICD-10-CM

## 2022-03-15 DIAGNOSIS — M54.16 LUMBAR RADICULOPATHY: Primary | ICD-10-CM

## 2022-03-15 DIAGNOSIS — M79.18 MYOFASCIAL PAIN: ICD-10-CM

## 2022-03-15 PROCEDURE — 99214 OFFICE O/P EST MOD 30 MIN: CPT | Performed by: PAIN MEDICINE

## 2022-03-15 ASSESSMENT — PAIN SCALES - GENERAL: PAINLEVEL: MODERATE PAIN (5)

## 2022-03-15 NOTE — LETTER
3/15/2022         RE: Miryam Hoang  [address] [place] [address]        Dear Colleague,    Thank you for referring your patient, Miryam Hoang, to the [facility] [place]. Please see a copy of my visit note below.      Assessment:     Diagnoses and all orders for this visit:  Lumbar radiculopathy  -     MR Lumbar Spine w/o Contrast; Future  -     Physical Therapy Referral; Future  Myofascial pain  -     MR Lumbar Spine w/o Contrast; Future  -     Physical Therapy Referral; Future  Scoliosis of thoracolumbar spine, unspecified scoliosis type  -     MR Lumbar Spine w/o Contrast; Future  -     Physical Therapy Referral; Future     Miryam Hoang is a 66 year old y.o. female with past medical history significant for obesity, depression, anxiety, snoring, postmenopausal status, bilateral chronic knee pain, essential hypertension, chronic left-sided low back with left-sided sciatica who presents today for follow-up regarding left lower extremity pain:    -Patient notes that she received 100% relief for 1 week with her left L2-3 transforaminal epidural steroid injection that was done in October 2021.  She thereafter had 75% relief for 1 more week and then pain returned.  Pain is likely secondary to lumbar radiculopathy.  MRI of lumbar spine is back from 2018.  I would like it updated 1 to further evaluate.     Plan:     A shared decision making plan was used. The patient's values and choices were respected. Prior medical records from our last visit on 11/10/2021 were reviewed today. The following represents what was discussed and decided upon by the provider and the patient.        -DIAGNOSTIC TESTS: Images were personally reviewed and interpreted.   --MRI of the lumbar spine dated 11/7/2018 is personally viewed images interpreted and discussed with patient.  It does show a disc protrusion at L3-4 with possible contact to the right L4 nerve root.  At L1-L2 there is a slight  increase in right central disc protrusion.  L2-3 there is an unchanged disc protrusion that may compress the left L3 nerve root.  At L4-5 there is unchanged moderate disc bulge with central disc protrusion with contact of bilateral traversing L5 nerve roots.  There is mild to moderate facet arthropathy at L5-S1, L4-5.  --Once I have reviewed MRI of lumbar spine I will have one of our nurses give her a call with results and recommendations with likely recommendations for injections.    -INTERVENTIONS: No interventions at this time.    -MEDICATIONS: No changes to medications.  -  Discussed side effects of medications and proper use. Patient verbalized understanding.    -PHYSICAL THERAPY: Ordered physical therapy to work on core strengthening as well as hip  strengthening and flexibility.  Like her to have a home-going exercise program that she can do on a daily basis.    Discussed the importance of core strengthening, ROM, stretching exercises with the patient and how each of these entities is important in decreasing pain.  Explained to the patient that the purpose of physical therapy is to teach the patient a home exercise program.  These exercises need to be performed every day in order to decrease pain and prevent future occurrences of pain.        -PATIENT EDUCATION: We discussed pain management in a multimodal fashion including physical therapy, medication management, possible future injections.    -FOLLOW UP: Patient will follow up 2 weeks after likely injections.  Advised to contact clinic if symptoms worsen or change.    Subjective:     Miryam Hoang is a 66 year old female who presents today for follow-up regarding left low back and lower extremity pain.  Patient notes that she had 100% relief from her injection back in October 2021 for 1 week and then 75% for 1 other week and thereafter pain returned.  She notes that majority of her pain is in her back and buttock area with about 20% of pain in her  left anterolateral thigh and shin.  She notes that she does physical therapy quite often at home but would like to have a refresher course.  Her pain today is 5/10 is worst is 10/10 is best is 1/10.  Her pain is worse with standing and walking improved with sitting.  She takes Advil and Tylenol.  She notes that this is somewhat helpful but not all that helpful.  She denies any bowel or bladder changes, fevers, chills, unintentional weight loss.    Evaluation to Date:  MRI of the lumbar spine dated 5/19/2008.  MRI of the lumbar spine dated 11/7/2018.     Treatment to Date: 7 sessions of physical therapy.Epidural steroid injection on 8/8/2008.  Ultrasound-guided right knee joint injection dated 3/21/2019.  Left L2-3 transforaminal epidural steroid injection done on 10/15/2021.    Patient Active Problem List   Diagnosis     Depression with anxiety     Severe obesity (BMI 35.0-39.9) with comorbidity (H)     Snoring     Postmenopausal status     Bilateral chronic knee pain     Essential hypertension     Chronic left-sided low back pain with left-sided sciatica     Benign paroxysmal positional vertigo of left ear       Current Outpatient Medications   Medication     calcium-vitamin D (CALCIUM-VITAMIN D) 500 mg(1,250mg) -200 unit per tablet     citalopram (CELEXA) 20 MG tablet     clobetasol (TEMOVATE) 0.05 % external solution     fluocinolone (DERMA-SMOOTHE) 0.01 % external oil     ibuprofen (ADVIL) 200 MG tablet     ketoconazole (NIZORAL) 2 % shampoo     lisinopril (ZESTRIL) 10 MG tablet     multivit-minerals/ferrous fum (MULTI VITAMIN ORAL)     No current facility-administered medications for this visit.       No Known Allergies    No past medical history on file.     Review of Systems  ROS:  Specifically negative for bowel/bladder dysfunction, balance changes, headache, dizziness, foot drop, fevers, chills, appetite changes, nausea/vomiting, unexplained weight loss. Otherwise 13 systems reviewed are negative. Please  see the patient's intake questionnaire from today for details.    Reviewed Social, Family, Past Medical and Past Surgical history with patient, no significant changes noted since prior visit.     Objective:     /80   Pulse 64   SpO2 98%     PHYSICAL EXAMINATION:    --CONSTITUTIONAL: Well developed, well nourished, healthy appearing individual.  --PSYCHIATRIC: Appropriate mood and affect. No difficulty interacting due to temper, social withdrawal, or memory issues.  --SKIN: Lumbar region is dry and intact.   --RESPIRATORY: Normal rhythm and effort. No abnormal accessory muscle breathing patterns noted.   --MUSCULOSKELETAL:  Normal lumbar lordosis noted, no lateral shift.  --GROSS MOTOR: Easily arises from a seated position. Gait is antalgic  --LUMBAR SPINE:  Inspection reveals no evidence of deformity. Range of motion is mildly limited in lumbar flexion, extension, lateral rotation.  Tenderness palpation crossed her left low back. Straight leg raising in the seated position is negative to radicular pain. Sciatic notch non-tender.   --SACROILIAC JOINT:  One Finger point test negative.  --LOWER EXTREMITY MOTOR TESTING:  Plantar flexion left 5/5, right 5/5   Dorsiflexion left 5/5, right 5/5   Great toe MTP extension left 5/5, right 5/5  Knee flexion left 5/5, right 5/5  Knee extension left 5/5, right 5/5   Hip flexion left 5/5, right 5/5  Hip abduction left 5/5, right 5/5  Hip adduction left 5/5, right 5/5   --NEUROLOGIC:  Sensation to light touch is intact in the bilateral L4, L5, and S1 dermatomes.    RESULTS:   Imaging: Lumbar spine imaging was reviewed today.                         Again, thank you for allowing me to participate in the care of your patient.        Sincerely,        Talon Bland, DO

## 2022-03-15 NOTE — PROGRESS NOTES
Assessment:     Diagnoses and all orders for this visit:  Lumbar radiculopathy  -     MR Lumbar Spine w/o Contrast; Future  -     Physical Therapy Referral; Future  Myofascial pain  -     MR Lumbar Spine w/o Contrast; Future  -     Physical Therapy Referral; Future  Scoliosis of thoracolumbar spine, unspecified scoliosis type  -     MR Lumbar Spine w/o Contrast; Future  -     Physical Therapy Referral; Future     Miryam Hoang is a 66 year old y.o. female with past medical history significant for obesity, depression, anxiety, snoring, postmenopausal status, bilateral chronic knee pain, essential hypertension, chronic left-sided low back with left-sided sciatica who presents today for follow-up regarding left lower extremity pain:    -Patient notes that she received 100% relief for 1 week with her left L2-3 transforaminal epidural steroid injection that was done in October 2021.  She thereafter had 75% relief for 1 more week and then pain returned.  Pain is likely secondary to lumbar radiculopathy.  MRI of lumbar spine is back from 2018.  I would like it updated 1 to further evaluate.     Plan:     A shared decision making plan was used. The patient's values and choices were respected. Prior medical records from our last visit on 11/10/2021 were reviewed today. The following represents what was discussed and decided upon by the provider and the patient.        -DIAGNOSTIC TESTS: Images were personally reviewed and interpreted.   --MRI of the lumbar spine dated 11/7/2018 is personally viewed images interpreted and discussed with patient.  It does show a disc protrusion at L3-4 with possible contact to the right L4 nerve root.  At L1-L2 there is a slight increase in right central disc protrusion.  L2-3 there is an unchanged disc protrusion that may compress the left L3 nerve root.  At L4-5 there is unchanged moderate disc bulge with central disc protrusion with contact of bilateral traversing L5 nerve roots.   There is mild to moderate facet arthropathy at L5-S1, L4-5.  --Once I have reviewed MRI of lumbar spine I will have one of our nurses give her a call with results and recommendations with likely recommendations for injections.    -INTERVENTIONS: No interventions at this time.    -MEDICATIONS: No changes to medications.  -  Discussed side effects of medications and proper use. Patient verbalized understanding.    -PHYSICAL THERAPY: Ordered physical therapy to work on core strengthening as well as hip  strengthening and flexibility.  Like her to have a home-going exercise program that she can do on a daily basis.    Discussed the importance of core strengthening, ROM, stretching exercises with the patient and how each of these entities is important in decreasing pain.  Explained to the patient that the purpose of physical therapy is to teach the patient a home exercise program.  These exercises need to be performed every day in order to decrease pain and prevent future occurrences of pain.        -PATIENT EDUCATION: We discussed pain management in a multimodal fashion including physical therapy, medication management, possible future injections.    -FOLLOW UP: Patient will follow up 2 weeks after likely injections.  Advised to contact clinic if symptoms worsen or change.    Subjective:     Miryam Hoang is a 66 year old female who presents today for follow-up regarding left low back and lower extremity pain.  Patient notes that she had 100% relief from her injection back in October 2021 for 1 week and then 75% for 1 other week and thereafter pain returned.  She notes that majority of her pain is in her back and buttock area with about 20% of pain in her left anterolateral thigh and shin.  She notes that she does physical therapy quite often at home but would like to have a refresher course.  Her pain today is 5/10 is worst is 10/10 is best is 1/10.  Her pain is worse with standing and walking improved with  sitting.  She takes Advil and Tylenol.  She notes that this is somewhat helpful but not all that helpful.  She denies any bowel or bladder changes, fevers, chills, unintentional weight loss.    Evaluation to Date:  MRI of the lumbar spine dated 5/19/2008.  MRI of the lumbar spine dated 11/7/2018.     Treatment to Date: 7 sessions of physical therapy.Epidural steroid injection on 8/8/2008.  Ultrasound-guided right knee joint injection dated 3/21/2019.  Left L2-3 transforaminal epidural steroid injection done on 10/15/2021.    Patient Active Problem List   Diagnosis     Depression with anxiety     Severe obesity (BMI 35.0-39.9) with comorbidity (H)     Snoring     Postmenopausal status     Bilateral chronic knee pain     Essential hypertension     Chronic left-sided low back pain with left-sided sciatica     Benign paroxysmal positional vertigo of left ear       Current Outpatient Medications   Medication     calcium-vitamin D (CALCIUM-VITAMIN D) 500 mg(1,250mg) -200 unit per tablet     citalopram (CELEXA) 20 MG tablet     clobetasol (TEMOVATE) 0.05 % external solution     fluocinolone (DERMA-SMOOTHE) 0.01 % external oil     ibuprofen (ADVIL) 200 MG tablet     ketoconazole (NIZORAL) 2 % shampoo     lisinopril (ZESTRIL) 10 MG tablet     multivit-minerals/ferrous fum (MULTI VITAMIN ORAL)     No current facility-administered medications for this visit.       No Known Allergies    No past medical history on file.     Review of Systems  ROS:  Specifically negative for bowel/bladder dysfunction, balance changes, headache, dizziness, foot drop, fevers, chills, appetite changes, nausea/vomiting, unexplained weight loss. Otherwise 13 systems reviewed are negative. Please see the patient's intake questionnaire from today for details.    Reviewed Social, Family, Past Medical and Past Surgical history with patient, no significant changes noted since prior visit.     Objective:     /80   Pulse 64   SpO2 98%     PHYSICAL  EXAMINATION:    --CONSTITUTIONAL: Well developed, well nourished, healthy appearing individual.  --PSYCHIATRIC: Appropriate mood and affect. No difficulty interacting due to temper, social withdrawal, or memory issues.  --SKIN: Lumbar region is dry and intact.   --RESPIRATORY: Normal rhythm and effort. No abnormal accessory muscle breathing patterns noted.   --MUSCULOSKELETAL:  Normal lumbar lordosis noted, no lateral shift.  --GROSS MOTOR: Easily arises from a seated position. Gait is antalgic  --LUMBAR SPINE:  Inspection reveals no evidence of deformity. Range of motion is mildly limited in lumbar flexion, extension, lateral rotation.  Tenderness palpation crossed her left low back. Straight leg raising in the seated position is negative to radicular pain. Sciatic notch non-tender.   --SACROILIAC JOINT:  One Finger point test negative.  --LOWER EXTREMITY MOTOR TESTING:  Plantar flexion left 5/5, right 5/5   Dorsiflexion left 5/5, right 5/5   Great toe MTP extension left 5/5, right 5/5  Knee flexion left 5/5, right 5/5  Knee extension left 5/5, right 5/5   Hip flexion left 5/5, right 5/5  Hip abduction left 5/5, right 5/5  Hip adduction left 5/5, right 5/5   --NEUROLOGIC:  Sensation to light touch is intact in the bilateral L4, L5, and S1 dermatomes.    RESULTS:   Imaging: Lumbar spine imaging was reviewed today.

## 2022-03-16 ENCOUNTER — HOSPITAL ENCOUNTER (OUTPATIENT)
Dept: MRI IMAGING | Facility: HOSPITAL | Age: 67
Discharge: HOME | End: 2022-03-16
Attending: PAIN MEDICINE | Admitting: PAIN MEDICINE
Payer: COMMERCIAL

## 2022-03-16 DIAGNOSIS — M41.9 SCOLIOSIS OF THORACOLUMBAR SPINE, UNSPECIFIED SCOLIOSIS TYPE: ICD-10-CM

## 2022-03-16 DIAGNOSIS — M54.16 LUMBAR RADICULOPATHY: ICD-10-CM

## 2022-03-16 DIAGNOSIS — M79.18 MYOFASCIAL PAIN: ICD-10-CM

## 2022-03-16 PROCEDURE — 72148 MRI LUMBAR SPINE W/O DYE: CPT

## 2022-03-18 ENCOUNTER — TELEPHONE (OUTPATIENT)
Dept: PHYSICAL MEDICINE AND REHAB | Facility: CLINIC | Age: 67
End: 2022-03-18
Payer: COMMERCIAL

## 2022-03-18 DIAGNOSIS — M54.16 LUMBAR RADICULOPATHY: Primary | ICD-10-CM

## 2022-03-18 NOTE — TELEPHONE ENCOUNTER
Patient returning call. Results given and explained. Discussed the recommended injections and how they are different from injections of October 2021. Patient would like to move forward with recommendation. Order placed in Epic. Injection requirements reviewed in full with patient. Stated understanding. Transferred to scheduling to make appointment.

## 2022-03-18 NOTE — TELEPHONE ENCOUNTER
----- Message from Talon Bland DO sent at 3/18/2022  7:36 AM CDT -----  Please call the patient let her know I reviewed MRI of her lumbar spine which does show moderate spinal canal stenosis at L3-4 and moderate to severe lateral recess narrowing at L4-5.  This is likely cause of pain.  I recommend left L3-4 and L4-5 transforaminal epidural steroid injections.  If she like these please have her scheduled for this.

## 2022-03-25 ENCOUNTER — RADIOLOGY INJECTION OFFICE VISIT (OUTPATIENT)
Dept: PHYSICAL MEDICINE AND REHAB | Facility: CLINIC | Age: 67
End: 2022-03-25
Attending: PAIN MEDICINE
Payer: COMMERCIAL

## 2022-03-25 VITALS
OXYGEN SATURATION: 97 % | DIASTOLIC BLOOD PRESSURE: 92 MMHG | HEART RATE: 63 BPM | SYSTOLIC BLOOD PRESSURE: 136 MMHG | TEMPERATURE: 98.7 F

## 2022-03-25 DIAGNOSIS — M54.16 LUMBAR RADICULOPATHY: ICD-10-CM

## 2022-03-25 PROCEDURE — 64484 NJX AA&/STRD TFRM EPI L/S EA: CPT | Mod: LT | Performed by: PAIN MEDICINE

## 2022-03-25 PROCEDURE — 64483 NJX AA&/STRD TFRM EPI L/S 1: CPT | Mod: LT | Performed by: PAIN MEDICINE

## 2022-03-25 RX ORDER — LIDOCAINE HYDROCHLORIDE 10 MG/ML
INJECTION, SOLUTION EPIDURAL; INFILTRATION; INTRACAUDAL; PERINEURAL
Status: COMPLETED | OUTPATIENT
Start: 2022-03-25 | End: 2022-03-25

## 2022-03-25 RX ORDER — DEXAMETHASONE SODIUM PHOSPHATE 10 MG/ML
INJECTION, SOLUTION INTRAMUSCULAR; INTRAVENOUS
Status: COMPLETED | OUTPATIENT
Start: 2022-03-25 | End: 2022-03-25

## 2022-03-25 RX ADMIN — DEXAMETHASONE SODIUM PHOSPHATE 20 MG: 10 INJECTION, SOLUTION INTRAMUSCULAR; INTRAVENOUS at 08:09

## 2022-03-25 RX ADMIN — LIDOCAINE HYDROCHLORIDE 1 ML: 10 INJECTION, SOLUTION EPIDURAL; INFILTRATION; INTRACAUDAL; PERINEURAL at 08:08

## 2022-03-25 ASSESSMENT — PAIN SCALES - GENERAL
PAINLEVEL: NO PAIN (0)
PAINLEVEL: MILD PAIN (3)

## 2022-03-25 NOTE — PATIENT INSTRUCTIONS
DISCHARGE INSTRUCTIONS    During office hours (8:00 a.m.- 4:00 p.m.) questions or concerns may be answered  by calling Spine Center Navigation Nurses at  [phone].  Messages received after hours will be returned the following business day.      In the case of an emergency, please dial 911 or seek assistance at the nearest Emergency Room/Urgent Care facility.     All Patients:  ? You may experience an increase in your symptoms for the first 2 days (It may take anywhere between 2 days- 2 weeks for the steroid to have maximum effect).    ? You may use ice on the injection site, as frequently as 20 minutes each hour if needed.    ? You may take your pain medicine.    ? You may continue taking your regular medication.    ? You may shower. No swimming, tub bath or hot tub for 48 hours.  You may remove your bandaid/bandage as soon as you are home.    ? You may resume light activities, as tolerated.    ? Resume your usual diet as tolerated.    ? It is strongly advised that you do not drive for 1-3 hours post injection.    ? If you have had oral sedation:  Do not drive for 8 hours post injection.      ? If you have had IV sedation:  Do not drive for 24 hours post injection.  Do not operate hazardous machinery or make important personal/business decisions for 24 hours.    POSSIBLE STEROID SIDE EFFECTS (If steroid/cortisone was used for your procedure)    -If you experience these symptoms, it should only last for a short period      Swelling of the legs                Skin redness (flushing)       Mouth (oral) irritation     Blood sugar (glucose) levels              Sweats                     Mood changes    Headache    Weakened immune system for up to 14 days, which could increase the risk of janet the COVID-19 virus and/or experiencing more severe symptoms of the disease, if exposed.         POSSIBLE PROCEDURE SIDE EFFECTS    -Call the Spine Center if you are concerned      Increased Pain             Increased  numbness/tingling        Nausea/Vomiting            Bruising/bleeding at site        Redness or swelling                                                Difficulty walking        Weakness            Fever greater than 100.5    *In the event of a severe headache after an epidural steroid injection that is relieved by lying down, please call the [facility] to speak with a clinical staff member*

## 2022-04-20 ENCOUNTER — OFFICE VISIT (OUTPATIENT)
Dept: PHYSICAL MEDICINE AND REHAB | Facility: CLINIC | Age: 67
End: 2022-04-20
Payer: COMMERCIAL

## 2022-04-20 VITALS — DIASTOLIC BLOOD PRESSURE: 80 MMHG | OXYGEN SATURATION: 98 % | SYSTOLIC BLOOD PRESSURE: 162 MMHG | HEART RATE: 73 BPM

## 2022-04-20 DIAGNOSIS — M79.18 MYOFASCIAL PAIN: ICD-10-CM

## 2022-04-20 DIAGNOSIS — M54.16 LUMBAR RADICULOPATHY: Primary | ICD-10-CM

## 2022-04-20 PROCEDURE — 99214 OFFICE O/P EST MOD 30 MIN: CPT | Performed by: PAIN MEDICINE

## 2022-04-20 RX ORDER — ERGOCALCIFEROL 1.25 MG/1
50000 CAPSULE ORAL
COMMUNITY

## 2022-04-20 RX ORDER — MULTIVIT-MIN/IRON/FOLIC ACID/K 18-600-40
CAPSULE ORAL SEE ADMIN INSTRUCTIONS
COMMUNITY

## 2022-04-20 RX ORDER — GLUCOSAMINE HCL 500 MG
TABLET ORAL
COMMUNITY

## 2022-04-20 RX ORDER — GABAPENTIN 300 MG/1
900 CAPSULE ORAL 3 TIMES DAILY
Qty: 270 CAPSULE | Refills: 1 | Status: SHIPPED | OUTPATIENT
Start: 2022-04-20

## 2022-04-20 ASSESSMENT — PAIN SCALES - GENERAL: PAINLEVEL: NO PAIN (1)

## 2022-04-20 NOTE — LETTER
4/20/2022         RE: Miryam Hoang  [address] [place] [address]        Dear Colleague,    Thank you for referring your patient, Miryam Hoang, to the [facility]. Please see a copy of my visit note below.      Assessment:     Diagnoses and all orders for this visit:  Lumbar radiculopathy  -     gabapentin (NEURONTIN) 300 MG capsule; Take 3 capsules (900 mg) by mouth 3 times daily Start taking this medication at bedtime and then follow chart of how I want you to increase this.  Myofascial pain  -     gabapentin (NEURONTIN) 300 MG capsule; Take 3 capsules (900 mg) by mouth 3 times daily Start taking this medication at bedtime and then follow chart of how I want you to increase this.     Miryam Hoang is a 66 year old y.o. female with past medical history significant for obesity, depression, anxiety, snoring, postmenopausal status, bilateral chronic knee pain, essential hypertension, chronic left-sided low back with left-sided sciatica who presents today for follow-up regarding left lower extremity pain:    --Patient received 80 to 85% relief with her left L3-4 and L4-5 transforaminal epidural steroid injections.  She does have leg pain still with increased activity and at night this is likely secondary to lumbar radiculopathy.     Plan:     A shared decision making plan was used. The patient's values and choices were respected. Prior medical records from our last visit on 3/15/2022 were reviewed today. The following represents what was discussed and decided upon by the provider and the patient.        -DIAGNOSTIC TESTS: Images were personally reviewed and interpreted.   --MRI of the lumbar spine dated 11/7/2018 is personally viewed images interpreted and discussed with patient.  It does show a disc protrusion at L3-4 with possible contact to the right L4 nerve root.  At L1-L2 there is a slight increase in right central disc protrusion.  L2-3 there is an  unchanged disc protrusion that may compress the left L3 nerve root.  At L4-5 there is unchanged moderate disc bulge with central disc protrusion with contact of bilateral traversing L5 nerve roots.  There is mild to moderate facet arthropathy at L5-S1, L4-5.  -- MRI of the lumbar spine dated 3/16/2022 is personally viewed images interpreted and discussed with patient.  I spent mild diffuse interval progression of degenerative changes throughout the lumbar spine since 11/7/2018 MRI.  At L4-5 there is moderate spinal canal stenosis with moderate to severe lateral recess stenosis bilaterally.  There is moderate right and mild left foraminal stenosis at L4-5.  L3-4 there is moderate spinal canal stenosis with moderate right lateral recess stenosis and moderate right and mild left foraminal stenosis.  At L2-3 there is mild spinal canal stenosis with moderate lateral recess stenosis bilaterally.    -INTERVENTIONS: No interventions at this time.    -MEDICATIONS: I ordered gabapentin 300 mg and given her chart of how I want her to take this.  She will increase it until she is taking doses between 600-900 mg 3 times daily.  -  Discussed side effects of medications and proper use. Patient verbalized understanding.    -PHYSICAL THERAPY: She will start physical therapy in May.  I recommend that she keep these appointments.    Discussed the importance of core strengthening, ROM, stretching exercises with the patient and how each of these entities is important in decreasing pain.  Explained to the patient that the purpose of physical therapy is to teach the patient a home exercise program.  These exercises need to be performed every day in order to decrease pain and prevent future occurrences of pain.        -PATIENT EDUCATION: We discussed pain management in a multimodal fashion including physical therapy, medication management, possible future injections.    -FOLLOW UP: Patient will follow up in 6 weeks.  Advised to contact  clinic if symptoms worsen or change.    Subjective:     Miryam Hoang is a 66 year old female who presents today for follow-up regarding low back and severe left lower extremity pain.  Patient notes that her leg pain has improved about 80 to 85%, however continues to have moderate to severe pain with increased activity and achiness and pain.  Her pain today is 1/10 at its worst is 8/10 as best as 1/10.  She will start physical therapy in May and is looking forward to this.  She is taking Tylenol and Advil with no relief.  She is more willing to try gabapentin at this time.  She denies any bowel or bladder changes, fevers, chills, unintentional weight loss.  Pain goes down the posterior lateral aspect of her left leg.    Evaluation to Date:  MRI of the lumbar spine dated 5/19/2008.  MRI of the lumbar spine dated 11/7/2018.  MRI of the lumbar spine dated 3/16/2022.     Treatment to Date: 7 sessions of physical therapy.Epidural steroid injection on 8/8/2008.  Ultrasound-guided right knee joint injection dated 3/21/2019.  Left L2-3 transforaminal epidural steroid injection done on 10/15/2021.  Left L3-4 and L4-5 transforaminal epidural steroid injections done on 3/25/2022.    Patient Active Problem List   Diagnosis     Depression with anxiety     Severe obesity (BMI 35.0-39.9) with comorbidity (H)     Snoring     Postmenopausal status     Bilateral chronic knee pain     Essential hypertension     Chronic left-sided low back pain with left-sided sciatica     Benign paroxysmal positional vertigo of left ear       Current Outpatient Medications   Medication     Ascorbic Acid (VITAMIN C) 500 MG CAPS     aspirin (ASA) 81 MG EC tablet     calcium-vitamin D (CALCIUM-VITAMIN D) 500 mg(1,250mg) -200 unit per tablet     Cholecalciferol (VITAMIN D3) 75 MCG (3000 UT) TABS     citalopram (CELEXA) 20 MG tablet     clobetasol (TEMOVATE) 0.05 % external solution     ergocalciferol (ERGOCALCIFEROL) 1.25 MG (01772 UT) capsule      fluocinolone (DERMA-SMOOTHE) 0.01 % external oil     gabapentin (NEURONTIN) 300 MG capsule     ibuprofen (ADVIL) 200 MG tablet     ketoconazole (NIZORAL) 2 % shampoo     lisinopril (ZESTRIL) 10 MG tablet     multivit-minerals/ferrous fum (MULTI VITAMIN ORAL)     No current facility-administered medications for this visit.       No Known Allergies    No past medical history on file.     Review of Systems  ROS:  Specifically negative for bowel/bladder dysfunction, balance changes, headache, dizziness, foot drop, fevers, chills, appetite changes, nausea/vomiting, unexplained weight loss. Otherwise 13 systems reviewed are negative. Please see the patient's intake questionnaire from today for details.    Reviewed Social, Family, Past Medical and Past Surgical history with patient, no significant changes noted since prior visit.     Objective:     BP (!) 162/80   Pulse 73   SpO2 98%     PHYSICAL EXAMINATION:    --CONSTITUTIONAL: Well developed, well nourished, healthy appearing individual.  --PSYCHIATRIC: Appropriate mood and affect. No difficulty interacting due to temper, social withdrawal, or memory issues.  --RESPIRATORY: Normal rhythm and effort. No abnormal accessory muscle breathing patterns noted.   --MUSCULOSKELETAL:  Normal lumbar lordosis noted, no lateral shift.  --GROSS MOTOR: Easily arises from a seated position. Gait is non-antalgic  --LUMBAR SPINE:  Inspection reveals no evidence of deformity. Range of motion is mildly limited in lumbar flexion, extension, lateral rotation.  Straight leg raising in the seated position is negative to radicular pain.   --SACROILIAC JOINT:   One Finger point test negative.  --LOWER EXTREMITY MOTOR TESTING:  Plantar flexion left 5/5, right 5/5   Dorsiflexion left 5/5, right 5/5   Great toe MTP extension left 5/5, right 5/5  Knee flexion left 5/5, right 5/5  Knee extension left 5/5, right 5/5   Hip flexion left 5/5, right 5/5  Hip abduction left 5/5, right 5/5  Hip adduction  left 5/5, right 5/5   --NEUROLOGIC:  Sensation to light touch is intact in the bilateral L4, L5, and S1 dermatomes.    RESULTS:   Imaging: Lumbar spine imaging was reviewed today. The images were shown to the patient and the findings were explained using a spine model.    PAIN Transforaminal REGINALD Inj Lumbosacral Two Levels Left    Result Date: 3/25/2022  LUMBAR EPIDURAL STEROID INJECTION TRANSFORAMINAL APPROACH WITH FLUOROSCOPIC GUIDANCE Performed on: 3/25/22 Pre Procedure Diagnosis:  LBP, Lumbar radiculitis Post Procedure Diagnosis:  Same Procedure Performed:  Lumbar Transforaminal Epidural Steroid Injection with Fluoroscopic Guidance Clinical Scenario:  As per office notes Anesthesia/Fluids:  As per intra-procedure documentation Vital Signs:  As per intra-procedure documentation Level Injected: L3-4 and L4-5 Side Injected: Left CC: Miryam Hoang is a 66 year-old female who presents today for a left L3-4 and L4-5 transforaminal epidural steroid injection as ordered by Dr. Bland.  The patient complains of left lower extremity.   Lumbar MRI was reviewed today and shows L3-4 and L4-5 foraminal narrowing.  The patient wanted to proceed with the injection today.  The patient denies feeling ill today or having an active infection (denies taking antibiotics).  The patient does not take any prescription blood thinning medications.  The patient denies any allergies to contrast.    The procedure of epidural steroid injection was discussed in detail along with the attendant risks, including but not limited to: infection, bleeding, nerve injury, paralysis.   The patient's questions were answered and they understood the information given.   The patient elected to proceed and signed informed consent.  . The patient was placed in a prone position on the fluoroscopy table. A procedural pause was conducted to verify: correct patient identity, procedure to be performed and as applicable, correct side and site, correct patient  "position, and availability of implants, special equipment and special requirements.  The lower back was prepped and draped in usual fashion.  After anesthetizing the skin with 1% lidocaine, a 5\", 22 gauge needle was introduced at the Left L3 and L4 pedicle under fluoroscopic guidance.  After aspiration was negative, 1 mL of  Omnipaque 300 was injected under continuous fluoroscopy.  Epidural flow without vascular uptake was seen.  1 mL of 1% lidocaine was injected as a test dose. After an appropriate period of observation, a directed neurological exam was performed which revealed no new neurologic deficits.    Subsequently, 10 mgs of Dexamethasone was slowly injected. Following the injection the needle was withdrawn slightly and flushed with local anesthetic as it was fully extracted.  The patient tolerated the procedure well and there were no apparent complications.  The patient did not develop any new neurologic deficits.  After appropriate observation, the patient was dismissed in good condition under their own power. PRE-PROCEDURE PAIN SCORE:  3/10 POST-PROCEDURE PAIN SCORE:  2/10 The patient tolerated the procedure well.  The patient was instructed to call the [facility] if any questions or concerns arise after the procedure. After a short period of observation the patient was discharged.  Patient will follow up with Dr. Bland in 2 to 4 weeks.                               Again, thank you for allowing me to participate in the care of your patient.        Sincerely,        Talon Bland, DO    "

## 2022-04-20 NOTE — PATIENT INSTRUCTIONS
Prescribed Gabapentin today, 300 mg tablets, to be titrated up to 3 tablets 3 times a day as tolerated for your nerve pain. Please follow Gabapentin dosing chart below.    Gabapentin 300mg Dosing Chart    DATE  MORNING AFTERNOON BEDTIME    Day 1 0 0 1    Day 2 0 0 1    Day 3 0 0 1    Day 4 1 0 1    Day 5 1 0 1    Day 6 1 0 1    Day 7 1 1 1    Day 8 1 1 1    Day 9 1 1 1    Day 10 1 1 2    Day 11 1 1 2    Day 12 1 1 2    Day 13 2 1 2    Day 14 2 1 2    Day 15 2 1 2    Day 16 2 2 2    Day 17 2 2 2    Day 18 2 2 2    Day 19 2 2 3    Day 20 2 2 3    Day 21 2 2 3    Day 22 3 2 3    Day 23 3 2 3    Day 24 3 2 3    Day 25 3 3 3    Day 26 3 3 3    Day 27 3 3 3     Continue medication, taking 3 capsules three times daily    Please call if you have any questions regarding how to take your medication  Clinic Phone # [phone]      ~Please call Nurse Navigation line [phone] with any questions or concerns about your treatment plan, if symptoms worsen and you would like to be seen urgently, or if you have problems controlling bladder and bowel function.

## 2022-04-20 NOTE — PROGRESS NOTES
Assessment:     Diagnoses and all orders for this visit:  Lumbar radiculopathy  -     gabapentin (NEURONTIN) 300 MG capsule; Take 3 capsules (900 mg) by mouth 3 times daily Start taking this medication at bedtime and then follow chart of how I want you to increase this.  Myofascial pain  -     gabapentin (NEURONTIN) 300 MG capsule; Take 3 capsules (900 mg) by mouth 3 times daily Start taking this medication at bedtime and then follow chart of how I want you to increase this.     Miryam Hoang is a 66 year old y.o. female with past medical history significant for obesity, depression, anxiety, snoring, postmenopausal status, bilateral chronic knee pain, essential hypertension, chronic left-sided low back with left-sided sciatica who presents today for follow-up regarding left lower extremity pain:    --Patient received 80 to 85% relief with her left L3-4 and L4-5 transforaminal epidural steroid injections.  She does have leg pain still with increased activity and at night this is likely secondary to lumbar radiculopathy.     Plan:     A shared decision making plan was used. The patient's values and choices were respected. Prior medical records from our last visit on 3/15/2022 were reviewed today. The following represents what was discussed and decided upon by the provider and the patient.        -DIAGNOSTIC TESTS: Images were personally reviewed and interpreted.   --MRI of the lumbar spine dated 11/7/2018 is personally viewed images interpreted and discussed with patient.  It does show a disc protrusion at L3-4 with possible contact to the right L4 nerve root.  At L1-L2 there is a slight increase in right central disc protrusion.  L2-3 there is an unchanged disc protrusion that may compress the left L3 nerve root.  At L4-5 there is unchanged moderate disc bulge with central disc protrusion with contact of bilateral traversing L5 nerve roots.  There is mild to moderate facet arthropathy at L5-S1, L4-5.  -- MRI of  the lumbar spine dated 3/16/2022 is personally viewed images interpreted and discussed with patient.  I spent mild diffuse interval progression of degenerative changes throughout the lumbar spine since 11/7/2018 MRI.  At L4-5 there is moderate spinal canal stenosis with moderate to severe lateral recess stenosis bilaterally.  There is moderate right and mild left foraminal stenosis at L4-5.  L3-4 there is moderate spinal canal stenosis with moderate right lateral recess stenosis and moderate right and mild left foraminal stenosis.  At L2-3 there is mild spinal canal stenosis with moderate lateral recess stenosis bilaterally.    -INTERVENTIONS: No interventions at this time.    -MEDICATIONS: I ordered gabapentin 300 mg and given her chart of how I want her to take this.  She will increase it until she is taking doses between 600-900 mg 3 times daily.  -  Discussed side effects of medications and proper use. Patient verbalized understanding.    -PHYSICAL THERAPY: She will start physical therapy in May.  I recommend that she keep these appointments.    Discussed the importance of core strengthening, ROM, stretching exercises with the patient and how each of these entities is important in decreasing pain.  Explained to the patient that the purpose of physical therapy is to teach the patient a home exercise program.  These exercises need to be performed every day in order to decrease pain and prevent future occurrences of pain.        -PATIENT EDUCATION: We discussed pain management in a multimodal fashion including physical therapy, medication management, possible future injections.    -FOLLOW UP: Patient will follow up in 6 weeks.  Advised to contact clinic if symptoms worsen or change.    Subjective:     Miryam Hoang is a 66 year old female who presents today for follow-up regarding low back and severe left lower extremity pain.  Patient notes that her leg pain has improved about 80 to 85%, however continues to  have moderate to severe pain with increased activity and achiness and pain.  Her pain today is 1/10 at its worst is 8/10 as best as 1/10.  She will start physical therapy in May and is looking forward to this.  She is taking Tylenol and Advil with no relief.  She is more willing to try gabapentin at this time.  She denies any bowel or bladder changes, fevers, chills, unintentional weight loss.  Pain goes down the posterior lateral aspect of her left leg.    Evaluation to Date:  MRI of the lumbar spine dated 5/19/2008.  MRI of the lumbar spine dated 11/7/2018.  MRI of the lumbar spine dated 3/16/2022.     Treatment to Date: 7 sessions of physical therapy.Epidural steroid injection on 8/8/2008.  Ultrasound-guided right knee joint injection dated 3/21/2019.  Left L2-3 transforaminal epidural steroid injection done on 10/15/2021.  Left L3-4 and L4-5 transforaminal epidural steroid injections done on 3/25/2022.    Patient Active Problem List   Diagnosis     Depression with anxiety     Severe obesity (BMI 35.0-39.9) with comorbidity (H)     Snoring     Postmenopausal status     Bilateral chronic knee pain     Essential hypertension     Chronic left-sided low back pain with left-sided sciatica     Benign paroxysmal positional vertigo of left ear       Current Outpatient Medications   Medication     Ascorbic Acid (VITAMIN C) 500 MG CAPS     aspirin (ASA) 81 MG EC tablet     calcium-vitamin D (CALCIUM-VITAMIN D) 500 mg(1,250mg) -200 unit per tablet     Cholecalciferol (VITAMIN D3) 75 MCG (3000 UT) TABS     citalopram (CELEXA) 20 MG tablet     clobetasol (TEMOVATE) 0.05 % external solution     ergocalciferol (ERGOCALCIFEROL) 1.25 MG (84641 UT) capsule     fluocinolone (DERMA-SMOOTHE) 0.01 % external oil     gabapentin (NEURONTIN) 300 MG capsule     ibuprofen (ADVIL) 200 MG tablet     ketoconazole (NIZORAL) 2 % shampoo     lisinopril (ZESTRIL) 10 MG tablet     multivit-minerals/ferrous fum (MULTI VITAMIN ORAL)     No current  facility-administered medications for this visit.       No Known Allergies    No past medical history on file.     Review of Systems  ROS:  Specifically negative for bowel/bladder dysfunction, balance changes, headache, dizziness, foot drop, fevers, chills, appetite changes, nausea/vomiting, unexplained weight loss. Otherwise 13 systems reviewed are negative. Please see the patient's intake questionnaire from today for details.    Reviewed Social, Family, Past Medical and Past Surgical history with patient, no significant changes noted since prior visit.     Objective:     BP (!) 162/80   Pulse 73   SpO2 98%     PHYSICAL EXAMINATION:    --CONSTITUTIONAL: Well developed, well nourished, healthy appearing individual.  --PSYCHIATRIC: Appropriate mood and affect. No difficulty interacting due to temper, social withdrawal, or memory issues.  --RESPIRATORY: Normal rhythm and effort. No abnormal accessory muscle breathing patterns noted.   --MUSCULOSKELETAL:  Normal lumbar lordosis noted, no lateral shift.  --GROSS MOTOR: Easily arises from a seated position. Gait is non-antalgic  --LUMBAR SPINE:  Inspection reveals no evidence of deformity. Range of motion is mildly limited in lumbar flexion, extension, lateral rotation.  Straight leg raising in the seated position is negative to radicular pain.   --SACROILIAC JOINT:   One Finger point test negative.  --LOWER EXTREMITY MOTOR TESTING:  Plantar flexion left 5/5, right 5/5   Dorsiflexion left 5/5, right 5/5   Great toe MTP extension left 5/5, right 5/5  Knee flexion left 5/5, right 5/5  Knee extension left 5/5, right 5/5   Hip flexion left 5/5, right 5/5  Hip abduction left 5/5, right 5/5  Hip adduction left 5/5, right 5/5   --NEUROLOGIC:  Sensation to light touch is intact in the bilateral L4, L5, and S1 dermatomes.    RESULTS:   Imaging: Lumbar spine imaging was reviewed today. The images were shown to the patient and the findings were explained using a spine  "model.    PAIN Transforaminal REGINALD Inj Lumbosacral Two Levels Left    Result Date: 3/25/2022  LUMBAR EPIDURAL STEROID INJECTION TRANSFORAMINAL APPROACH WITH FLUOROSCOPIC GUIDANCE Performed on: 3/25/22 Pre Procedure Diagnosis:  LBP, Lumbar radiculitis Post Procedure Diagnosis:  Same Procedure Performed:  Lumbar Transforaminal Epidural Steroid Injection with Fluoroscopic Guidance Clinical Scenario:  As per office notes Anesthesia/Fluids:  As per intra-procedure documentation Vital Signs:  As per intra-procedure documentation Level Injected: L3-4 and L4-5 Side Injected: Left CC: Miryam Hoang is a 66 year-old female who presents today for a left L3-4 and L4-5 transforaminal epidural steroid injection as ordered by Dr. Bland.  The patient complains of left lower extremity.   Lumbar MRI was reviewed today and shows L3-4 and L4-5 foraminal narrowing.  The patient wanted to proceed with the injection today.  The patient denies feeling ill today or having an active infection (denies taking antibiotics).  The patient does not take any prescription blood thinning medications.  The patient denies any allergies to contrast.    The procedure of epidural steroid injection was discussed in detail along with the attendant risks, including but not limited to: infection, bleeding, nerve injury, paralysis.   The patient's questions were answered and they understood the information given.   The patient elected to proceed and signed informed consent.  . The patient was placed in a prone position on the fluoroscopy table. A procedural pause was conducted to verify: correct patient identity, procedure to be performed and as applicable, correct side and site, correct patient position, and availability of implants, special equipment and special requirements.  The lower back was prepped and draped in usual fashion.  After anesthetizing the skin with 1% lidocaine, a 5\", 22 gauge needle was introduced at the Left L3 and L4 pedicle under " fluoroscopic guidance.  After aspiration was negative, 1 mL of  Omnipaque 300 was injected under continuous fluoroscopy.  Epidural flow without vascular uptake was seen.  1 mL of 1% lidocaine was injected as a test dose. After an appropriate period of observation, a directed neurological exam was performed which revealed no new neurologic deficits.    Subsequently, 10 mgs of Dexamethasone was slowly injected. Following the injection the needle was withdrawn slightly and flushed with local anesthetic as it was fully extracted.  The patient tolerated the procedure well and there were no apparent complications.  The patient did not develop any new neurologic deficits.  After appropriate observation, the patient was dismissed in good condition under their own power. PRE-PROCEDURE PAIN SCORE:  3/10 POST-PROCEDURE PAIN SCORE:  2/10 The patient tolerated the procedure well.  The patient was instructed to call the [facility] if any questions or concerns arise after the procedure. After a short period of observation the patient was discharged.  Patient will follow up with Dr. Bland in 2 to 4 weeks.

## 2022-04-25 ENCOUNTER — TELEPHONE (OUTPATIENT)
Dept: PHYSICAL MEDICINE AND REHAB | Facility: CLINIC | Age: 67
End: 2022-04-25
Payer: COMMERCIAL

## 2022-04-26 NOTE — TELEPHONE ENCOUNTER
Spoke to pharmacy, they will fill the 90 tablets. Spoke to patient, let her know the qty limit set by insurance. Informed her per provider, when she starts to run low, send us a Spool message to inform us on how she is tolerating the titration and what dose she is taking. At that point, provider can re evaluate.  Malina Madrid MA

## 2022-05-18 ENCOUNTER — HOSPITAL ENCOUNTER (OUTPATIENT)
Dept: PHYSICAL THERAPY | Facility: REHABILITATION | Age: 67
Discharge: HOME | End: 2022-05-18
Attending: PAIN MEDICINE
Payer: COMMERCIAL

## 2022-05-18 DIAGNOSIS — M79.18 MYOFASCIAL PAIN: ICD-10-CM

## 2022-05-18 DIAGNOSIS — M41.9 SCOLIOSIS OF THORACOLUMBAR SPINE, UNSPECIFIED SCOLIOSIS TYPE: ICD-10-CM

## 2022-05-18 DIAGNOSIS — M54.16 LUMBAR RADICULOPATHY: ICD-10-CM

## 2022-05-18 PROCEDURE — 97110 THERAPEUTIC EXERCISES: CPT | Mod: GP

## 2022-05-18 PROCEDURE — 97161 PT EVAL LOW COMPLEX 20 MIN: CPT | Mod: GP

## 2022-05-19 NOTE — PROGRESS NOTES
05/18/22 1300   General Information   Type of Visit Initial OP Ortho PT Evaluation   Start of Care Date 05/18/22   Referring Physician Talon Bland, DO   Orders Evaluate and Treat   Date of Order 03/15/22   Certification Required? No   Medical Diagnosis M54.16 (ICD-10-CM) - Lumbar radiculopathy M79.18 (ICD-10-CM) - Myofascial pain M41.9 (ICD-10-CM) - Scoliosis of thoracolumbar spine, unspecified scoliosis type   Body Part(s)   Body Part(s) Lumbar Spine/SI;Hip   Presentation and Etiology   Pertinent history of current problem (include personal factors and/or comorbidities that impact the POC) Patient reports a newer onset of low back pain which started about half a year ago. She recently underwent both knee replacements. She has had a couple of injections into the back which have been helpful. She also has had her hip x-rayed recently which revealed significant arthritis. Functionally, she has the most difficulty with upright activities including standing, walking, bending, lifting. She can not walk for more than an hour. Will occasionally get tingling down the L leg.   Impairments A. Pain;D. Decreased ROM;E. Decreased flexibility;F. Decreased strength and endurance;G. Impaired balance;H. Impaired gait   Functional Limitations perform activities of daily living;perform required work activities   Symptom Location L Low back and hip   How/Where did it occur From insidious onset   Chronicity Chronic   Pain rating (0-10 point scale) Best (/10);Worst (/10)   Best (/10) 1   Worst (/10) 10   Pain quality B. Dull;C. Aching   Frequency of pain/symptoms B. Intermittent   Pain/symptoms are: Worse during the night   Pain/symptoms exacerbated by I. Bending;G. Certain positions;C. Lifting;B. Walking   Pain/symptoms eased by I. OTC medication(s);F. Certain positions;E. Changing positions;C. Rest   Progression of symptoms since onset: Unchanged   Current / Previous Interventions   Diagnostic Tests: MRI   Fall Risk Screen    Fall screen completed by PT   Have you fallen 2 or more times in the past year? No   Have you fallen and had an injury in the past year? No   Is patient a fall risk? No   Abuse Screen (yes response referral indicated)   Feels Unsafe at Home or Work/School no   Feels Threatened by Someone no   Does Anyone Try to Keep You From Having Contact with Others or Doing Things Outside Your Home? no   Physical Signs of Abuse Present no   System Outcome Measures   Outcome Measures Low Back Pain (see Oswestry and Yesy)  (24%)   Lumbar Spine/SI Objective Findings   Gait/Locomotion Mild antalgia L side, trendelenburg lean L   Balance/Proprioception (Single Leg Stance) Pain on L SLS   Hamstring Flexibility No deficits noted   Piriformis Flexibility Limited B, L>R   Flexion ROM Fingertip to floor: 8 cm   Extension ROM Severe loss with stiffness   Right Side Bending ROM Fingertips to mid thigh with pain   Left Side Bending ROM Fingertips to mid thigh with pain   Pelvic Screen Landmarks level   Hip Screen Decreased L hip PROM; flexion to 90; limited into IR and ER   Hip Flexion (L2) Strength 5/5 R, 4/5 L   Hip Abduction Strength 4/5 R; 4-/5 L pain   Hip Extension Strength 4/5 B   Knee Flexion Strength 5/5 B   Knee Extension (L3) Strength 5/5 B   Ankle Dorsiflexion (L4) Strength 5/5 B   Great Toe Extension (L5) Strength 5/5 B   Ankle Plantar Flexion (S1) Strength 5/5 B   SLR 90 R; 68 L   Crossover SLR (+) L   Segmental Mobility Slightly hypomobile with PAIVMs throughout lumbar spine   Palpation Mildly tender to the L hip rotators, L hip flexors   Slump Test (-)   Integumentary No deficits noted   Posture Mild anterior pelvic tilt   Sensation Testing Intact   Planned Therapy Interventions   Planned Therapy Interventions ADL retraining;balance training;joint mobilization;manual therapy;neuromuscular re-education;ROM;strengthening;stretching;gait training   Planned Modality Interventions   Planned Modality Interventions  Biofeedback;Cryotherapy;Electrical stimulation;TENS;Traction;Ultrasound;Hot packs   Clinical Impression   Criteria for Skilled Therapeutic Interventions Met yes, treatment indicated   PT Diagnosis L Hip and low back pain; core and hip weakness; decreased trunk and L hip ROM   Functional limitations due to impairments Standing, walking, stairs, bending, twisting, lifting   Clinical Presentation Stable/Uncomplicated   Clinical Decision Making (Complexity) Low complexity   Therapy Frequency 1 time/week   Predicted Duration of Therapy Intervention (days/wks) 10 weeks up to 10 visits dependent on clinic availability   Risk & Benefits of therapy have been explained Yes   Patient, Family & other staff in agreement with plan of care Yes   Clinical Impression Comments Patient is a 66 year old female presenting to physical therapy with L hip and low back pain which started 6-8 months ago. She has had chronic low back pain, but just had a radiograph of her hip which revealed arthritis and bone spurring which is also likely contributing to her pain. She has poor core and hip strength. Patient would benefit from skilled PT services to address limitations.   ORTHO GOALS   PT Ortho Eval Goals 1;2;3;4   Ortho Goal 1   Goal Identifier SUHA   Goal Description Patient will improve SUHA score by at least 10% to show improvement in function.   Target Date 07/28/22   Ortho Goal 2   Goal Identifier Transfers   Goal Description Patient will improve ability to get in and out of car with no pain to improve ability to complete driving and ADLs.   Target Date 07/28/22   Ortho Goal 3   Goal Identifier Standing   Goal Description Patient will improve standing tolerance to at least 30 minutes to improve ability in completing upright ADLs.   Total Evaluation Time   PT Eval, Low Complexity Minutes (40081) 22     Scott Parmer, PT, DPT

## 2022-07-20 ENCOUNTER — LAB REQUISITION (OUTPATIENT)
Dept: LAB | Facility: CLINIC | Age: 67
End: 2022-07-20

## 2022-07-20 DIAGNOSIS — E55.9 VITAMIN D DEFICIENCY, UNSPECIFIED: ICD-10-CM

## 2022-07-20 DIAGNOSIS — I10 ESSENTIAL (PRIMARY) HYPERTENSION: ICD-10-CM

## 2022-07-20 LAB
ANION GAP SERPL CALCULATED.3IONS-SCNC: 11 MMOL/L (ref 7–15)
BUN SERPL-MCNC: 14.7 MG/DL (ref 8–23)
CALCIUM SERPL-MCNC: 9.1 MG/DL (ref 8.8–10.2)
CHLORIDE SERPL-SCNC: 103 MMOL/L (ref 98–107)
CREAT SERPL-MCNC: 0.73 MG/DL (ref 0.51–0.95)
DEPRECATED HCO3 PLAS-SCNC: 25 MMOL/L (ref 22–29)
GFR SERPL CREATININE-BSD FRML MDRD: >90 ML/MIN/1.73M2
GLUCOSE SERPL-MCNC: 85 MG/DL (ref 70–99)
POTASSIUM SERPL-SCNC: 4 MMOL/L (ref 3.4–5.3)
SODIUM SERPL-SCNC: 139 MMOL/L (ref 136–145)

## 2022-07-20 PROCEDURE — 82310 ASSAY OF CALCIUM: CPT | Performed by: FAMILY MEDICINE

## 2022-07-20 PROCEDURE — 82306 VITAMIN D 25 HYDROXY: CPT | Performed by: FAMILY MEDICINE

## 2022-07-21 LAB — DEPRECATED CALCIDIOL+CALCIFEROL SERPL-MC: 41 UG/L (ref 20–75)

## 2022-09-09 ENCOUNTER — LAB REQUISITION (OUTPATIENT)
Dept: LAB | Facility: CLINIC | Age: 67
End: 2022-09-09

## 2022-09-09 DIAGNOSIS — R60.0 LOCALIZED EDEMA: ICD-10-CM

## 2022-09-09 PROCEDURE — 80048 BASIC METABOLIC PNL TOTAL CA: CPT | Performed by: FAMILY MEDICINE

## 2022-09-10 LAB
ANION GAP SERPL CALCULATED.3IONS-SCNC: 10 MMOL/L (ref 7–15)
BUN SERPL-MCNC: 20.4 MG/DL (ref 8–23)
CALCIUM SERPL-MCNC: 9.5 MG/DL (ref 8.8–10.2)
CHLORIDE SERPL-SCNC: 105 MMOL/L (ref 98–107)
CREAT SERPL-MCNC: 0.83 MG/DL (ref 0.51–0.95)
DEPRECATED HCO3 PLAS-SCNC: 27 MMOL/L (ref 22–29)
GFR SERPL CREATININE-BSD FRML MDRD: 78 ML/MIN/1.73M2
GLUCOSE SERPL-MCNC: 86 MG/DL (ref 70–99)
POTASSIUM SERPL-SCNC: 4.3 MMOL/L (ref 3.4–5.3)
SODIUM SERPL-SCNC: 142 MMOL/L (ref 136–145)

## 2022-09-15 ENCOUNTER — VIRTUAL VISIT (OUTPATIENT)
Dept: SURGERY | Facility: CLINIC | Age: 67
End: 2022-09-15
Payer: COMMERCIAL

## 2022-09-15 VITALS — WEIGHT: 274 LBS | HEIGHT: 70 IN | BODY MASS INDEX: 39.22 KG/M2

## 2022-09-15 DIAGNOSIS — Z13.29 SCREENING FOR ENDOCRINE, NUTRITIONAL, METABOLIC AND IMMUNITY DISORDER: ICD-10-CM

## 2022-09-15 DIAGNOSIS — Z13.21 SCREENING FOR ENDOCRINE, NUTRITIONAL, METABOLIC AND IMMUNITY DISORDER: ICD-10-CM

## 2022-09-15 DIAGNOSIS — I10 ESSENTIAL HYPERTENSION: ICD-10-CM

## 2022-09-15 DIAGNOSIS — Z13.228 SCREENING FOR ENDOCRINE, NUTRITIONAL, METABOLIC AND IMMUNITY DISORDER: ICD-10-CM

## 2022-09-15 DIAGNOSIS — E66.01 SEVERE OBESITY (BMI 35.0-39.9) WITH COMORBIDITY (H): Primary | Chronic | ICD-10-CM

## 2022-09-15 DIAGNOSIS — Z13.0 SCREENING FOR ENDOCRINE, NUTRITIONAL, METABOLIC AND IMMUNITY DISORDER: ICD-10-CM

## 2022-09-15 PROCEDURE — 99204 OFFICE O/P NEW MOD 45 MIN: CPT | Mod: 95 | Performed by: PHYSICIAN ASSISTANT

## 2022-09-15 RX ORDER — TOPIRAMATE 25 MG/1
25 TABLET, FILM COATED ORAL DAILY
Qty: 60 TABLET | Refills: 0 | Status: SHIPPED | OUTPATIENT
Start: 2022-09-15

## 2022-09-15 NOTE — PATIENT INSTRUCTIONS
It was good meeting you today!  Please call [phone] and schedule a return visit in 6 weeks. Below is the information about the medication we discussed.      PLEASE SCHEDULE A NURSE ONLY APPOINTMENT AT A Distant CLINIC TO GET HEIGHT, WEIGHT, BLOOD PRESSURE AND PULSE TAKEN. THEY CAN THEN FORWARD RESULT OVER.     [place]      Goals:  Swim at [employer] 3 times weekly for 20 minutes each.    Increase water to 60 oz daily  Get lab drawn or lab sent over        MEDICATION STARTED AT THIS APPOINTMENT    We are starting topiramate at bedtime.  Take one tab, 25 mg, daily until you are seen again. Call the nurse at [phone] if you have any questions or concerns. (Do not stop taking it if you don't think it's working. For some people it works even though they do not feel much different.)    Topiramate (Topamax) is a medication that is used most often to treat migraine headaches or for seizures. It has also been found to help with weight loss. Although it's not currently FDA approved for weight loss, it has been used safely for a number of years to help people who are carrying extra weight.     Just how topiramate helps with weight loss has not been exactly determined. However it seems to work on areas of the brain to quiet down signals related to eating.      Topiramate may make you:    >feel less interest in eating in between meals   >think less about food and eating   >find it easier to push the plate away   >find giving up pop easier    >have an easier time eating less    For some of our patients, the pills work right away. They feel and think quite differently about food. Other patients don't feel much of a change but find in fact they have lost weight! Like all weight loss medications, topiramate works best when you help it work.  This means:    1) Have less tempting high calorie (fattening) food around the house or office    2) Have lower calorie food (fruits, vegetables,low fat meats and dairy) for snacks    3)  Eat out only one time or less each week.   4) Eat your meals at a table with the TV or computer off.    Side-effects. Topiramate is generally well tolerated. The main side-effects we see are:   Tingling in hands,feet, or face (usually not very troublesome)   Mental confusion and word finding trouble (about 10% of patients have this.)     Feeling sleepy or a bit dopey- this goes away very soon after starting.    One of the dangers of topiramate is the possibility of birth defects--if you get pregnant when you are on it, there is the risk that your baby will be born with a cleft lip or palate.  If you are on topiramate and of child bearing age, you need to be on a reliable form of birth control or refrain from sexual intercourse.     Please refer to the pharmacy insert for more information on side-effects. Since many pharmacists are not familiar with the use of topiramate in weight loss, calling the clinic will get you the most accurate information on the use of this medication for weight loss.    In order to get refills of this or any medication we prescribe you must be seen in the medical weight mgmt clinic every 2-3 months.

## 2022-09-30 ENCOUNTER — TELEPHONE (OUTPATIENT)
Dept: SURGERY | Facility: CLINIC | Age: 67
End: 2022-09-30

## 2022-09-30 NOTE — TELEPHONE ENCOUNTER
Daniel Okeefe:    No.Starting a new medication that is being titrated up          Pharmacy notified.   Shirley Neri RN on 9/30/2022 at 2:46 PM

## 2022-09-30 NOTE — TELEPHONE ENCOUNTER
Fax received re: topiramate  From: [facility], [address] [place] [address]  Phone: [phone]    Pharmacy requesting 90 day supply instead of 60 for topiramate.     Patient started taking topiramate on 9/15.     Shirley Neri RN on 9/30/2022 at 11:57 AM

## 2022-10-01 ENCOUNTER — HEALTH MAINTENANCE LETTER (OUTPATIENT)
Age: 67
End: 2022-10-01

## 2022-10-04 NOTE — TELEPHONE ENCOUNTER
Received 2nd notification that a 90-day supply for topiramate was requested.  Called pharmacy and denied.  Pharmacist stated that insurance didn't require the 90 -day supply and pt good to go.  Lamar Rodriguez, MS, RD, RN

## 2022-11-28 ENCOUNTER — LAB REQUISITION (OUTPATIENT)
Dept: LAB | Facility: CLINIC | Age: 67
End: 2022-11-28

## 2022-11-28 DIAGNOSIS — Z01.818 ENCOUNTER FOR OTHER PREPROCEDURAL EXAMINATION: ICD-10-CM

## 2022-11-28 LAB
ANION GAP SERPL CALCULATED.3IONS-SCNC: 16 MMOL/L (ref 7–15)
BUN SERPL-MCNC: 17.1 MG/DL (ref 8–23)
CALCIUM SERPL-MCNC: 9.3 MG/DL (ref 8.8–10.2)
CHLORIDE SERPL-SCNC: 104 MMOL/L (ref 98–107)
CREAT SERPL-MCNC: 0.75 MG/DL (ref 0.51–0.95)
DEPRECATED HCO3 PLAS-SCNC: 23 MMOL/L (ref 22–29)
GFR SERPL CREATININE-BSD FRML MDRD: 88 ML/MIN/1.73M2
GLUCOSE SERPL-MCNC: 76 MG/DL (ref 70–99)
POTASSIUM SERPL-SCNC: 4.5 MMOL/L (ref 3.4–5.3)
SODIUM SERPL-SCNC: 143 MMOL/L (ref 136–145)

## 2022-11-28 PROCEDURE — 82310 ASSAY OF CALCIUM: CPT | Performed by: FAMILY MEDICINE

## 2022-12-16 ENCOUNTER — LAB REQUISITION (OUTPATIENT)
Dept: LAB | Facility: HOSPITAL | Age: 67
End: 2022-12-16
Payer: COMMERCIAL

## 2022-12-16 ENCOUNTER — LAB (OUTPATIENT)
Dept: LAB | Facility: HOSPITAL | Age: 67
End: 2022-12-16
Payer: COMMERCIAL

## 2022-12-16 DIAGNOSIS — Z13.228 SCREENING FOR ENDOCRINE, NUTRITIONAL, METABOLIC AND IMMUNITY DISORDER: ICD-10-CM

## 2022-12-16 DIAGNOSIS — Z13.29 SCREENING FOR ENDOCRINE, NUTRITIONAL, METABOLIC AND IMMUNITY DISORDER: ICD-10-CM

## 2022-12-16 DIAGNOSIS — E66.01 SEVERE OBESITY (BMI 35.0-39.9) WITH COMORBIDITY (H): Chronic | ICD-10-CM

## 2022-12-16 DIAGNOSIS — Z13.21 SCREENING FOR ENDOCRINE, NUTRITIONAL, METABOLIC AND IMMUNITY DISORDER: ICD-10-CM

## 2022-12-16 DIAGNOSIS — Z13.0 SCREENING FOR ENDOCRINE, NUTRITIONAL, METABOLIC AND IMMUNITY DISORDER: ICD-10-CM

## 2022-12-16 LAB
ERYTHROCYTE [DISTWIDTH] IN BLOOD BY AUTOMATED COUNT: 13 % (ref 10–15)
HBA1C MFR BLD: 5.2 %
HCT VFR BLD AUTO: 43.8 % (ref 35–47)
HGB BLD-MCNC: 13.9 G/DL (ref 11.7–15.7)
MCH RBC QN AUTO: 28.7 PG (ref 26.5–33)
MCHC RBC AUTO-ENTMCNC: 31.7 G/DL (ref 31.5–36.5)
MCV RBC AUTO: 91 FL (ref 78–100)
PLATELET # BLD AUTO: 239 10E3/UL (ref 150–450)
RBC # BLD AUTO: 4.84 10E6/UL (ref 3.8–5.2)
WBC # BLD AUTO: 6.6 10E3/UL (ref 4–11)

## 2022-12-16 PROCEDURE — 85027 COMPLETE CBC AUTOMATED: CPT | Mod: ORL | Performed by: NURSE PRACTITIONER

## 2022-12-16 PROCEDURE — 83036 HEMOGLOBIN GLYCOSYLATED A1C: CPT

## 2023-06-07 ENCOUNTER — LAB REQUISITION (OUTPATIENT)
Dept: LAB | Facility: CLINIC | Age: 68
End: 2023-06-07

## 2023-06-07 DIAGNOSIS — I10 ESSENTIAL (PRIMARY) HYPERTENSION: ICD-10-CM

## 2023-06-07 DIAGNOSIS — E55.9 VITAMIN D DEFICIENCY, UNSPECIFIED: ICD-10-CM

## 2023-06-07 LAB
ANION GAP SERPL CALCULATED.3IONS-SCNC: 14 MMOL/L (ref 7–15)
BUN SERPL-MCNC: 16.3 MG/DL (ref 8–23)
CALCIUM SERPL-MCNC: 9 MG/DL (ref 8.8–10.2)
CHLORIDE SERPL-SCNC: 102 MMOL/L (ref 98–107)
CREAT SERPL-MCNC: 0.76 MG/DL (ref 0.51–0.95)
DEPRECATED HCO3 PLAS-SCNC: 24 MMOL/L (ref 22–29)
GFR SERPL CREATININE-BSD FRML MDRD: 86 ML/MIN/1.73M2
GLUCOSE SERPL-MCNC: 81 MG/DL (ref 70–99)
POTASSIUM SERPL-SCNC: 4.1 MMOL/L (ref 3.4–5.3)
SODIUM SERPL-SCNC: 140 MMOL/L (ref 136–145)

## 2023-06-07 PROCEDURE — 82306 VITAMIN D 25 HYDROXY: CPT | Performed by: FAMILY MEDICINE

## 2023-06-07 PROCEDURE — 80048 BASIC METABOLIC PNL TOTAL CA: CPT | Performed by: FAMILY MEDICINE

## 2023-06-08 LAB — DEPRECATED CALCIDIOL+CALCIFEROL SERPL-MC: 60 UG/L (ref 20–75)

## 2023-10-15 ENCOUNTER — HEALTH MAINTENANCE LETTER (OUTPATIENT)
Age: 68
End: 2023-10-15

## 2023-11-22 ENCOUNTER — ANCILLARY PROCEDURE (OUTPATIENT)
Dept: MAMMOGRAPHY | Facility: HOSPITAL | Age: 68
End: 2023-11-22
Attending: FAMILY MEDICINE
Payer: COMMERCIAL

## 2023-11-22 ENCOUNTER — HOSPITAL ENCOUNTER (OUTPATIENT)
Dept: BONE DENSITY | Facility: HOSPITAL | Age: 68
Discharge: HOME | End: 2023-11-22
Attending: FAMILY MEDICINE
Payer: COMMERCIAL

## 2023-11-22 DIAGNOSIS — Z12.31 VISIT FOR SCREENING MAMMOGRAM: ICD-10-CM

## 2023-11-22 DIAGNOSIS — Z78.0 ASYMPTOMATIC MENOPAUSAL STATE: ICD-10-CM

## 2023-11-22 PROCEDURE — 77067 SCR MAMMO BI INCL CAD: CPT

## 2023-11-22 PROCEDURE — 77080 DXA BONE DENSITY AXIAL: CPT

## 2023-11-22 PROCEDURE — 77081 DXA BONE DENSITY APPENDICULR: CPT

## 2024-07-21 ENCOUNTER — HEALTH MAINTENANCE LETTER (OUTPATIENT)
Age: 69
End: 2024-07-21

## 2024-09-27 ENCOUNTER — LAB REQUISITION (OUTPATIENT)
Dept: LAB | Facility: CLINIC | Age: 69
End: 2024-09-27

## 2024-09-27 DIAGNOSIS — D64.9 ANEMIA, UNSPECIFIED: ICD-10-CM

## 2024-09-27 LAB
BASOPHILS # BLD AUTO: 0.1 10E3/UL (ref 0–0.2)
BASOPHILS NFR BLD AUTO: 1 %
EOSINOPHIL # BLD AUTO: 0.2 10E3/UL (ref 0–0.7)
EOSINOPHIL NFR BLD AUTO: 4 %
ERYTHROCYTE [DISTWIDTH] IN BLOOD BY AUTOMATED COUNT: 16.6 % (ref 10–15)
HCT VFR BLD AUTO: 36.4 % (ref 35–47)
HGB BLD-MCNC: 10.8 G/DL (ref 11.7–15.7)
IMM GRANULOCYTES # BLD: 0 10E3/UL
IMM GRANULOCYTES NFR BLD: 0 %
LYMPHOCYTES # BLD AUTO: 1.7 10E3/UL (ref 0.8–5.3)
LYMPHOCYTES NFR BLD AUTO: 32 %
MCH RBC QN AUTO: 24.3 PG (ref 26.5–33)
MCHC RBC AUTO-ENTMCNC: 29.7 G/DL (ref 31.5–36.5)
MCV RBC AUTO: 82 FL (ref 78–100)
MONOCYTES # BLD AUTO: 0.5 10E3/UL (ref 0–1.3)
MONOCYTES NFR BLD AUTO: 10 %
NEUTROPHILS # BLD AUTO: 2.8 10E3/UL (ref 1.6–8.3)
NEUTROPHILS NFR BLD AUTO: 52 %
NRBC # BLD AUTO: 0 10E3/UL
NRBC BLD AUTO-RTO: 0 /100
PLATELET # BLD AUTO: 259 10E3/UL (ref 150–450)
RBC # BLD AUTO: 4.44 10E6/UL (ref 3.8–5.2)
RETICS # AUTO: 0.04 10E6/UL (ref 0.03–0.1)
RETICS/RBC NFR AUTO: 0.8 % (ref 0.5–2)
WBC # BLD AUTO: 5.3 10E3/UL (ref 4–11)

## 2024-09-27 PROCEDURE — 85060 BLOOD SMEAR INTERPRETATION: CPT | Performed by: PATHOLOGY

## 2024-09-27 PROCEDURE — 85045 AUTOMATED RETICULOCYTE COUNT: CPT | Performed by: FAMILY MEDICINE

## 2024-09-27 PROCEDURE — 85025 COMPLETE CBC W/AUTO DIFF WBC: CPT | Performed by: FAMILY MEDICINE

## 2024-09-30 LAB
PATH REPORT.COMMENTS IMP SPEC: NORMAL
PATH REPORT.COMMENTS IMP SPEC: NORMAL
PATH REPORT.FINAL DX SPEC: NORMAL
PATH REPORT.MICROSCOPIC SPEC OTHER STN: NORMAL
PATH REPORT.MICROSCOPIC SPEC OTHER STN: NORMAL
PATH REPORT.RELEVANT HX SPEC: NORMAL

## 2024-12-06 ENCOUNTER — ANCILLARY PROCEDURE (OUTPATIENT)
Dept: MAMMOGRAPHY | Facility: HOSPITAL | Age: 69
End: 2024-12-06
Attending: FAMILY MEDICINE
Payer: COMMERCIAL

## 2024-12-06 ENCOUNTER — LAB REQUISITION (OUTPATIENT)
Dept: LAB | Facility: CLINIC | Age: 69
End: 2024-12-06

## 2024-12-06 DIAGNOSIS — Z12.31 VISIT FOR SCREENING MAMMOGRAM: ICD-10-CM

## 2024-12-06 DIAGNOSIS — D64.9 ANEMIA, UNSPECIFIED: ICD-10-CM

## 2024-12-06 PROCEDURE — 77063 BREAST TOMOSYNTHESIS BI: CPT

## 2024-12-06 PROCEDURE — 77067 SCR MAMMO BI INCL CAD: CPT

## 2024-12-06 PROCEDURE — 83550 IRON BINDING TEST: CPT | Performed by: FAMILY MEDICINE

## 2024-12-06 PROCEDURE — 83540 ASSAY OF IRON: CPT | Performed by: FAMILY MEDICINE

## 2024-12-07 LAB
IRON BINDING CAPACITY (ROCHE): 341 UG/DL (ref 240–430)
IRON SATN MFR SERPL: 49 % (ref 15–46)
IRON SERPL-MCNC: 166 UG/DL (ref 37–145)

## 2025-02-11 ENCOUNTER — LAB REQUISITION (OUTPATIENT)
Dept: LAB | Facility: CLINIC | Age: 70
End: 2025-02-11

## 2025-02-11 DIAGNOSIS — I10 ESSENTIAL (PRIMARY) HYPERTENSION: ICD-10-CM

## 2025-02-11 PROCEDURE — 82374 ASSAY BLOOD CARBON DIOXIDE: CPT | Performed by: FAMILY MEDICINE

## 2025-02-11 PROCEDURE — 80048 BASIC METABOLIC PNL TOTAL CA: CPT | Performed by: FAMILY MEDICINE

## 2025-02-12 LAB
ANION GAP SERPL CALCULATED.3IONS-SCNC: 16 MMOL/L (ref 7–15)
BUN SERPL-MCNC: 25.4 MG/DL (ref 8–23)
CALCIUM SERPL-MCNC: 9.6 MG/DL (ref 8.8–10.4)
CHLORIDE SERPL-SCNC: 101 MMOL/L (ref 98–107)
CREAT SERPL-MCNC: 0.82 MG/DL (ref 0.51–0.95)
EGFRCR SERPLBLD CKD-EPI 2021: 78 ML/MIN/1.73M2
GLUCOSE SERPL-MCNC: 87 MG/DL (ref 70–99)
HCO3 SERPL-SCNC: 24 MMOL/L (ref 22–29)
POTASSIUM SERPL-SCNC: 4 MMOL/L (ref 3.4–5.3)
SODIUM SERPL-SCNC: 141 MMOL/L (ref 135–145)

## 2025-05-06 ENCOUNTER — RADIOLOGY INJECTION OFFICE VISIT (OUTPATIENT)
Dept: PHYSICAL MEDICINE AND REHAB | Facility: CLINIC | Age: 70
End: 2025-05-06
Attending: PAIN MEDICINE
Payer: COMMERCIAL

## 2025-05-06 VITALS
OXYGEN SATURATION: 96 % | DIASTOLIC BLOOD PRESSURE: 84 MMHG | HEART RATE: 85 BPM | SYSTOLIC BLOOD PRESSURE: 132 MMHG | RESPIRATION RATE: 16 BRPM | TEMPERATURE: 98.2 F

## 2025-05-06 DIAGNOSIS — M54.16 LUMBAR RADICULOPATHY: ICD-10-CM

## 2025-05-06 PROCEDURE — 64484 NJX AA&/STRD TFRM EPI L/S EA: CPT | Mod: LT | Performed by: PAIN MEDICINE

## 2025-05-06 PROCEDURE — 64483 NJX AA&/STRD TFRM EPI L/S 1: CPT | Mod: LT | Performed by: PAIN MEDICINE

## 2025-05-06 RX ORDER — LIDOCAINE HYDROCHLORIDE 10 MG/ML
INJECTION, SOLUTION EPIDURAL; INFILTRATION; INTRACAUDAL; PERINEURAL
Status: COMPLETED | OUTPATIENT
Start: 2025-05-06 | End: 2025-05-06

## 2025-05-06 RX ORDER — DEXAMETHASONE SODIUM PHOSPHATE 10 MG/ML
INJECTION, SOLUTION INTRAMUSCULAR; INTRAVENOUS
Status: COMPLETED | OUTPATIENT
Start: 2025-05-06 | End: 2025-05-06

## 2025-05-06 RX ADMIN — DEXAMETHASONE SODIUM PHOSPHATE 20 MG: 10 INJECTION, SOLUTION INTRAMUSCULAR; INTRAVENOUS at 08:47

## 2025-05-06 RX ADMIN — LIDOCAINE HYDROCHLORIDE 4 ML: 10 INJECTION, SOLUTION EPIDURAL; INFILTRATION; INTRACAUDAL; PERINEURAL at 08:46

## 2025-05-06 ASSESSMENT — PAIN SCALES - GENERAL
PAINLEVEL_OUTOF10: MILD PAIN (2)
PAINLEVEL_OUTOF10: NO PAIN (0)

## 2025-05-06 NOTE — PATIENT INSTRUCTIONS
DISCHARGE INSTRUCTIONS    During office hours (8:00 a.m.- 4:00 p.m.) questions or concerns may be answered  by calling [facility] Nurses at  [phone].  Messages received after hours will be returned the following business day.      In the case of an emergency, please dial 911 or seek assistance at the nearest Emergency Room/Urgent Care facility.     All Patients:    You may experience an increase in your symptoms for the first 2 days (It may take anywhere between 2 days - 2 weeks for the steroid to have maximum effect).    You may use ice on the injection site, as frequently as 20 minutes each hour if needed.    You may take your pain medicine.    You may continue taking your regular medication after your injection. If you have had a medial branch block you may resume pain medication once your pain diary is completed.    You may shower. No swimming, tub bath, or hot tub for 48 hours.  You may remove your bandaid/bandage as soon as you are home.    You may resume light activities, as tolerated.    Resume your usual diet as tolerated.    If you were told to hold any blood thinning medications you may resume taking them 24 hours after your procedure as prescribed.    It is strongly advised that you do not drive for 1-3 hours post injection.    If you have had oral sedation:  Do not drive for 8 hours post injection.        POSSIBLE STEROID SIDE EFFECTS (If steroid/cortisone was used for your procedure    Swelling of the legs              Skin redness (flushing)     Mouth (oral) irritation   Increased blood sugar (glucose) levels            Sweats                    Mood changes  Headache  Sleeplessness  Weakened immune system for up to 14 days, which could increase the risk of janet the COVID-19 virus and/or experiencing more severe symptoms of the disease, if exposed.  Decreased effectiveness of vaccines if given within 2 weeks of the steroid.    -If you experience these symptoms, it should  only last for a short period         POSSIBLE PROCEDURE SIDE EFFECTS    Increased Pain           Increased numbness/tingling      Nausea/Vomiting          Bruising/bleeding at site      Redness or swelling                                              Difficulty walking      Weakness           Fever greater than 100.5    -Call the [facility] Center if you are concerned    *In the event of a severe headache after an epidural steroid injection that is relieved by lying down, please call the [facility] to speak with a clinical staff member*

## 2025-05-13 ENCOUNTER — OFFICE VISIT (OUTPATIENT)
Dept: VASCULAR SURGERY | Facility: CLINIC | Age: 70
End: 2025-05-13
Attending: HOSPITALIST
Payer: COMMERCIAL

## 2025-05-13 VITALS
BODY MASS INDEX: 41.95 KG/M2 | OXYGEN SATURATION: 95 % | DIASTOLIC BLOOD PRESSURE: 83 MMHG | WEIGHT: 293 LBS | SYSTOLIC BLOOD PRESSURE: 133 MMHG | HEIGHT: 70 IN | HEART RATE: 66 BPM | RESPIRATION RATE: 16 BRPM | TEMPERATURE: 98.2 F

## 2025-05-13 DIAGNOSIS — R60.9 LIPEDEMA: ICD-10-CM

## 2025-05-13 DIAGNOSIS — E66.813 OBESITY, CLASS 3 (H): ICD-10-CM

## 2025-05-13 DIAGNOSIS — I87.2 CHRONIC VENOUS INSUFFICIENCY OF LOWER EXTREMITY: ICD-10-CM

## 2025-05-13 DIAGNOSIS — I89.0 LYMPHEDEMA DUE TO LIPEDEMA: Primary | ICD-10-CM

## 2025-05-13 DIAGNOSIS — M79.89 LEG SWELLING: ICD-10-CM

## 2025-05-13 DIAGNOSIS — R60.9 LYMPHEDEMA DUE TO LIPEDEMA: Primary | ICD-10-CM

## 2025-05-13 PROCEDURE — 1126F AMNT PAIN NOTED NONE PRSNT: CPT | Performed by: HOSPITALIST

## 2025-05-13 PROCEDURE — 3079F DIAST BP 80-89 MM HG: CPT | Performed by: HOSPITALIST

## 2025-05-13 PROCEDURE — 3075F SYST BP GE 130 - 139MM HG: CPT | Performed by: HOSPITALIST

## 2025-05-13 PROCEDURE — 99205 OFFICE O/P NEW HI 60 MIN: CPT | Performed by: HOSPITALIST

## 2025-05-13 PROCEDURE — G2211 COMPLEX E/M VISIT ADD ON: HCPCS | Performed by: HOSPITALIST

## 2025-05-13 PROCEDURE — 99214 OFFICE O/P EST MOD 30 MIN: CPT | Performed by: HOSPITALIST

## 2025-05-13 RX ORDER — FUROSEMIDE 20 MG/1
20 TABLET ORAL EVERY MORNING
COMMUNITY
Start: 2025-03-11

## 2025-05-13 ASSESSMENT — PAIN SCALES - GENERAL: PAINLEVEL_OUTOF10: NO PAIN (0)

## 2025-05-13 NOTE — Clinical Note
Needs 6 month follow up around 11/14/25 with Dr. Kern and venous comp US prior.  6 month follow up BLE swelling, lymphedema therapy, weight mgmt, stockings or velcro?

## 2025-05-13 NOTE — PROGRESS NOTES
"[facility]        Patient is here for a consult to discuss BLE swelling, L>R. The patient states he/she does not wear compressions. Swelling is observed in both lower extremities.    Pt is currently taking Aspirin.    /83   Pulse 66   Temp 98.2  F (36.8  C)   Resp 16   Ht 5' 10\" (1.778 m)   Wt 294 lb (133.4 kg)   SpO2 95%   BMI 42.18 kg/m      The provider has been notified that the patient has concerns of BLE swelling.     Questions patient would like addressed today are: N/A.    Refills are needed: N/A    Has homecare services and agency name:  No           "

## 2025-05-13 NOTE — PATIENT INSTRUCTIONS
Familia Witt,    Thank you for entrusting your care with us today. After your visit today with Dr. Jazlyn Kern this is the plan that was discussed at your appointment.    Go to lymphedema therapy.      See weight management.     Wear double layer tubular compression until lymphedema therapy makes a plan for you.      Start compression stockings or velcro compression daily depending on what lymphedema therapist says.     Follow up in 6 months with an ultrasound of your legs prior.  A  will reach out to you closer to that time to schedule.     I am including additional information on these things and our contact information if you have any questions or concerns.   Please do not hesitate to reach out to us if you felt we did not answer your questions or you are unsure of the treatment plan after your visit today. Our number is [phone].Thank you for trusting us with your care.         Again thank you for your time.         Wear your compression stockings every day; put them on first thing in the morning and remove at bedtime    Replace your compression stockings every 6 months; these garments will lose their elasticity and become ineffective    Hang your stockings, do not put them in the dryer.  This will help prolong the life of your compressions stockings.     Elevate your legs periodically throughout the day, 30-60 minutes 1-3 times per day    Do calf pumping exercises periodically throughout the day.        ===========================================================    Your provider has recommended you start wearing compression stockings. You may get these at any [employer] supply store. You may also purchase stockings online through outside vendors. If you order online, be sure you are purchasing the correct compression stockings based on the prescription from your doctor. If you are unsure what to order, please call our clinic at [phone] and ask to speak with a nurse or send us a  [employer] message.    [employer]  [employer]: [email or web address]: [email or web address]  [employer]: [email or web address]    ===========================================================  Learning About Using Compression Stockings  Compression stockings help prevent blood and fluid from pooling in the legs. They may be used for problems like varicose veins, skin ulcers, and deep vein thrombosis (blood clot in the leg). There are different types of stockings, and they need to fit right. Your doctor will recommend what you need.  5 tips for putting on compression stockings    If your stockings are new, wash them in cold water.  This can make them easier to put on.     Put on your stockings early in the morning, if you can.  This is when you have the least swelling in your legs.     Wear them every day while you're awake, especially while you're on your feet.       Try putting silicone lotion or cornstarch on your legs.  This can make it easier to slide the stockings on.     To help your , try using rubber gloves.  Ask your doctor about other tools to help if it's hard to put on the stockings.   How to put on compression stockings  It can be a little tricky to put on compression stockings at first. But after you practice a few times, it may get easier. Here are the details.    Sit on a firm surface where your feet can touch the ground.   Hold the top of the stocking with one hand. Then with your other hand, reach in and grab the heel.     When you have a firm  on the heel, pull your hand back up through the stocking, turning it inside out as far as the heel.   Put your toes in as far as they will go. Then center your heel in the stocking and pull it up slightly, just around your heel.     Use both hands to grasp the folded part of the stocking about 2 inches below the fold. Pull that section up over your ankle.   Next, from above your ankle, grasp the folded part of the stocking about 2 inches below the  "fold. Pull that section up.     Continue pulling the stocking up in short sections until it is in its final position. The final position may be below your knee. Or it might be above your knee.   Run your hands over the stocking to smooth it out.   Where can you learn more?  Go to https://www.localbacon.net/patiented  Enter J166 in the search box to learn more about \"Learning About Using Compression Stockings.\"  Current as of: December 19, 2022               Content Version: 13.7    2125-5375 [employer].   Care instructions adapted under license by your healthcare professional. If you have questions about a medical condition or this instruction, always ask your healthcare professional. [employer] disclaims any warranty or liability for your use of this information.      Compression Stocking Tricks for Application and Removal    Purchase ALPS Prosthetic Fitting Lotion  Apply pea-sized amount to each leg prior to donning stockings  https://www.[employer]//products/juzo-alps-fitting-lotion          Donning Devices              Big Garcia        Garden Gloves               "

## 2025-05-13 NOTE — PROGRESS NOTES
VASCULAR MEDICINE CONSULT NOTE          LOCATION:  [facility]       Date of Service: 5/13/2025      Primary Care Provider: Miryam Guerra  Referring provider;  Referred Self      Reason for the visit/chief complaint:   LLE edema       HPI:  Miryam TORRIE Viktor is a pleasant 69 year old female who presents to our Vascular Medicine clinic for the above mentioned reason.    Patient reports that she started noticing left lower extremity swelling approximately 6 months ago.  She has had left hip replacement on this leg approximately a year prior to noticing the swelling.  The swelling has been gradual, worse by the end of the day, better with elevation, associated with some achiness and heaviness.  She did wear compression stockings after her hip replacement surgery but was unable to fit them on her current size leg.  Tried furosemide prescribed by her PCP but did not see any improvement.    Denies DVT or varicose veins on the left lower extremity but does have history of provoked superficial vein thrombosis in the right lower extremity after knee replacement surgery years ago that was treated with short-term anticoagulation.  Although no significant swelling on the right lower extremity, she does also note heaviness and achiness.    She reports that she was always overight or obese since she was a child.  She does not believe that her obesity significantly worsened at a specific time but her weight seems to be progressively getting more as she is getting older.  She does report disproportionate increase in weight between lower and upper body.  She has always had larger buttock, thigh and legs as well as upper arms.  Reports that her mother had exactly the same body shape.    Reports lumpy feeling of her extremities.  Denies significant tenderness to touch.  Minimal bruising itendency.    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  Has no prior history of  "pregnancies.  No smoking history.  Reports similar    Other medical history includes hypertension, chronic left-sided low back pain with lumbar radiculopathy.      REVIEW OF SYSTEMS:    A 12 point ROS was reviewed and is negative except what is mentioned in HPI.       Past medical history, surgical history, medications, family history, social history and allergies were reviewed. Pertinent points mentioned under HPI.        OBJECTIVE:    Vital signs:  /83   Pulse 66   Temp 98.2  F (36.8  C)   Resp 16   Ht 5' 10\" (1.778 m)   Wt 294 lb (133.4 kg)   SpO2 95%   BMI 42.18 kg/m    Wt Readings from Last 1 Encounters:   05/13/25 294 lb (133.4 kg)     Body mass index is 42.18 kg/m .    Physical exam:  General appearance: Pleasant female in no apparent distress.    HEENT: NC/AT.    Neck:No jugular venous distension.   Heart: RRR. Normal S1, S2. No murmur, rub, click, or gallop.   Chest: Clear to auscultation bilaterally.  Abdomen: Soft, nontender, nondistended.     Extremities and skin: Bilateral lower extremities are disproportionately larger when compared to upper body with larger buttock, hips and legs and tapering of the swelling at the distal leg with no swelling noted in bilateral ankles or feet.  Left lower extremity is notably larger than the right.  Skin thickening noted.  Healed wound on the left inner ankle noted.  Negative Stemmer sign bilaterally.  Additionally, both arms are larger when compared to forearm.  Subcutaneous nodules felt throughout bilateral lower extremities and upper arms.  Skin indentation and unevenness noted on lower body with skin folding over the knees.  An area of bruising seen on the right distal thigh.  Varicosities felt on the right medial calf with scattered reticular veins bilaterally.  No tenderness noted with deep palpation to bilateral lower extremities.  No stasis dermatitis, lipodermatosclerosis or skin wounds  Neurological: Alert, awake and oriented "             DIAGNOSTIC STUDIES:   I personally reviewed outside venous duplex ultrasound from 9/16/2024.  Recent BMP from 2/11/2025 with normal kidney function.        ASSESSMENT AND PLAN:    Phlebo-lipo-lymphedema  Venous insufficiency, CEAP C5  Acquired lymphedema of the left lower extremity  Lipedema  History of provoked right lower extremity DVT after knee replacement surgery years ago  History of traumatic left medial ankle wound, currently healed  Obesity class III BMI 42      Ms. Hoang has evidence of all lipedema, venous insufficiency and acquired lymphedema of the left lower extremity.  We spent significant time discussing that 3 entities.  We discussed the similarities and differences between the 3 conditions.    We will have her see lymphedema therapy to initiate CDT for her lymphedema on the left lower extremity which is her main concern.  We will obtain venous competency study prior to next visit to further evaluate her venous insufficiency.  She did have a prior small wound on her left inner ankle but this seemed to be traumatic.  All healed up now.    We also spent time discussing the nature of lipedema and the inflammatory and genetic background.  We discussed the importance of following anti-inflammatory diet, exercising and weight loss overall.  We will refer her to weight management clinic and she is in agreement.      Recommendations:  Refer to lymphedema therapy for CDT.  Referral to weight management clinic.  Discussed the importance of compression therapy use, leg elevation and exercising.  We discussed that she could wear tubular compression for now until seen by lymphedema therapy.  RN gave education.  Discussed anti-inflammatory diet.  Discussed few options for supplements but agreed to postpone these.  Follow-up in 6 months.    It was a pleasure meeting Ms. Hoang in our clinic today.      Jazlyn Kern MD, [facility]  Vascular Medicine  May 13, 2025    The longitudinal plan of care for the  diagnosis(es)/condition(s) as documented were addressed during this visit. Due to the added complexity in care, I will continue to support Miryam in the subsequent management and with ongoing continuity of care.    I spent 60 minutes on the date of the encounter doing chart review/review of outside records/review of test results/interpretation of tests/patient visit and documentation.

## 2025-05-14 ENCOUNTER — PATIENT OUTREACH (OUTPATIENT)
Dept: CARE COORDINATION | Facility: CLINIC | Age: 70
End: 2025-05-14
Payer: COMMERCIAL

## 2025-05-23 PROBLEM — I89.0 LYMPHEDEMA DUE TO LIPEDEMA: Status: ACTIVE | Noted: 2025-05-23

## 2025-05-23 PROBLEM — I87.2 CHRONIC VENOUS INSUFFICIENCY OF LOWER EXTREMITY: Status: ACTIVE | Noted: 2025-05-23

## 2025-05-23 PROBLEM — R60.9 LYMPHEDEMA DUE TO LIPEDEMA: Status: ACTIVE | Noted: 2025-05-23

## 2025-05-30 ENCOUNTER — THERAPY VISIT (OUTPATIENT)
Dept: PHYSICAL THERAPY | Facility: REHABILITATION | Age: 70
End: 2025-05-30
Attending: HOSPITALIST
Payer: COMMERCIAL

## 2025-05-30 DIAGNOSIS — I87.2 CHRONIC VENOUS INSUFFICIENCY OF LOWER EXTREMITY: ICD-10-CM

## 2025-05-30 DIAGNOSIS — R60.9 LYMPHEDEMA DUE TO LIPEDEMA: Primary | ICD-10-CM

## 2025-05-30 DIAGNOSIS — I89.0 LYMPHEDEMA DUE TO LIPEDEMA: Primary | ICD-10-CM

## 2025-05-30 PROCEDURE — 97140 MANUAL THERAPY 1/> REGIONS: CPT | Mod: GP | Performed by: PHYSICAL THERAPIST

## 2025-06-11 ENCOUNTER — THERAPY VISIT (OUTPATIENT)
Dept: PHYSICAL THERAPY | Facility: REHABILITATION | Age: 70
End: 2025-06-11
Attending: HOSPITALIST
Payer: COMMERCIAL

## 2025-06-11 DIAGNOSIS — R60.9 LYMPHEDEMA DUE TO LIPEDEMA: Primary | ICD-10-CM

## 2025-06-11 DIAGNOSIS — I89.0 LYMPHEDEMA DUE TO LIPEDEMA: Primary | ICD-10-CM

## 2025-06-11 DIAGNOSIS — I87.2 CHRONIC VENOUS INSUFFICIENCY OF LOWER EXTREMITY: ICD-10-CM

## 2025-06-11 PROCEDURE — 97140 MANUAL THERAPY 1/> REGIONS: CPT | Mod: GP | Performed by: PHYSICAL THERAPIST

## 2025-08-10 ENCOUNTER — HEALTH MAINTENANCE LETTER (OUTPATIENT)
Age: 70
End: 2025-08-10

## 2025-08-14 ENCOUNTER — TELEPHONE (OUTPATIENT)
Dept: VASCULAR SURGERY | Facility: CLINIC | Age: 70
End: 2025-08-14
Payer: COMMERCIAL